# Patient Record
Sex: MALE | Race: WHITE
[De-identification: names, ages, dates, MRNs, and addresses within clinical notes are randomized per-mention and may not be internally consistent; named-entity substitution may affect disease eponyms.]

---

## 2017-01-28 ENCOUNTER — HOSPITAL (OUTPATIENT)
Dept: OTHER | Age: 62
End: 2017-01-28
Attending: INTERNAL MEDICINE

## 2017-01-28 LAB
ALBUMIN SERPL-MCNC: 3.7 GM/DL (ref 3.6–5.1)
ALBUMIN/GLOB SERPL: 0.9 {RATIO} (ref 1–2.4)
ALP SERPL-CCNC: 137 UNIT/L (ref 45–117)
ALT SERPL-CCNC: 37 UNIT/L
ANALYZER ANC (IANC): NORMAL
ANION GAP SERPL CALC-SCNC: 15 MMOL/L (ref 10–20)
AST SERPL-CCNC: 42 UNIT/L
BASOPHILS # BLD: 0 THOUSAND/MCL (ref 0–0.3)
BASOPHILS NFR BLD: 0 %
BILIRUB SERPL-MCNC: 0.8 MG/DL (ref 0.2–1)
BUN SERPL-MCNC: 13 MG/DL (ref 10–20)
BUN/CREAT SERPL: 21 (ref 7–25)
CALCIUM SERPL-MCNC: 8.4 MG/DL (ref 8.4–10.2)
CHLORIDE: 101 MMOL/L (ref 98–107)
CO2 SERPL-SCNC: 26 MMOL/L (ref 21–32)
CREAT SERPL-MCNC: 0.63 MG/DL (ref 0.67–1.17)
DIFFERENTIAL METHOD BLD: NORMAL
EOSINOPHIL # BLD: 0.1 THOUSAND/MCL (ref 0.1–0.5)
EOSINOPHIL NFR BLD: 1 %
ERYTHROCYTE [DISTWIDTH] IN BLOOD: 12.6 % (ref 11–15)
ETHANOL SERPL-MCNC: 234 MG/DL
GLOBULIN SER-MCNC: 3.9 GM/DL (ref 2–4)
GLUCOSE SERPL-MCNC: 143 MG/DL (ref 65–99)
HEMATOCRIT: 41 % (ref 39–51)
HGB BLD-MCNC: 14.4 GM/DL (ref 13–17)
LYMPHOCYTES # BLD: 1.8 THOUSAND/MCL (ref 1–4)
LYMPHOCYTES NFR BLD: 32 %
MAGNESIUM SERPL-MCNC: 2.4 MG/DL (ref 1.7–2.4)
MCH RBC QN AUTO: 30.5 PG (ref 26–34)
MCHC RBC AUTO-ENTMCNC: 35.1 GM/DL (ref 32–36.5)
MCV RBC AUTO: 86.9 FL (ref 78–100)
MONOCYTES # BLD: 0.8 THOUSAND/MCL (ref 0.3–0.9)
MONOCYTES NFR BLD: 14 %
NEUTROPHILS # BLD: 3 THOUSAND/MCL (ref 1.8–7.7)
NEUTROPHILS NFR BLD: 53 %
NEUTS SEG NFR BLD: NORMAL %
PERCENT NRBC: NORMAL
PLATELET # BLD: 177 THOUSAND/MCL (ref 140–450)
POTASSIUM SERPL-SCNC: 3.9 MMOL/L (ref 3.4–5.1)
PROT SERPL-MCNC: 7.6 GM/DL (ref 6.4–8.2)
RBC # BLD: 4.72 MILLION/MCL (ref 4.5–5.9)
SODIUM SERPL-SCNC: 138 MMOL/L (ref 135–145)
WBC # BLD: 5.7 THOUSAND/MCL (ref 4.2–11)

## 2017-01-28 NOTE — ED PROVIDER NOTES
Patient Seen in: [facility] Emergency Department    History   Patient presents with:  Eval-P (psychiatric)    Stated Complaint: ETOH, eval p    HPI    11year-old male who presents here to the emergency department wanting detox from alcohol.   The pat reviewed. No pertinent family history.       Smoking Status: Former Smoker                   Packs/Day: 2.00  Years: 20        Types: Cigarettes      Quit date: 01/01/1990    Alcohol Use: Yes           56.4 oz/week       24 Cans of beer, 70 Shots of liquor of motion is noted of the extremities without deformities. No tenderness. Neurologically intact. No tremors.        ED Course     Labs Reviewed   COMP METABOLIC PANEL (14) - Abnormal; Notable for the following:     Glucose 133 (*)     BUN 23 (*)     Fanta (primary encounter diagnosis)    Disposition:  Discharge    Follow-up:  Yajaira Renner, [address] [facility] [address]  [phone]    Schedule an appointment as soon as possible for a visit in 2 days        Medications P

## 2017-01-28 NOTE — ED NOTES
Pt denies any S/I or H/I at this time. Pt here for help to stop drinking. Pt states he cannot stop drinking on his own. Pt states he has been drinking for the past week all day long. Pt states today he realized it was time to quit.  Pt states his family is

## 2017-01-28 NOTE — ED PROVIDER NOTES
Patient is a 62-year-old male presents the emergency department secondary to alcohol abuse. He was initially evaluated by Dr. Penny Arzate, please refer to his documentation for additional details. Patient was signed out to me at change of shift.   He was obs

## 2017-01-28 NOTE — BH LEVEL OF CARE ASSESSMENT
Comprehensive Assessment Note  General Questions   Why are you here?: Pt is a 62 yr old male who arrived to the ER via ambulance. Pt has a hx of alcohol abuse.  Per ER MD, pt stated he was sober for 1 year then relapsed 5 days ago and has been drinking sinc Current/Recent Suicide Risk Collateral: na   Past Suicidal Ideation: Refused to discuss (pt refused assessment)   Past Suicide Plan: Refused to discuss (pt refused assessment)   Past Suicide Intent: Refused to discuss (pt refused assessment)   Past Suicide refused assessment)   Use of Sleep Aids: (pt refused assessment)   Appetite Symptoms: (pt refused assessment)   Unplanned Weight Loss: (pt refused assessment)   Unplanned Weight Gain: (pt refused assessment)   History of Eating Disorder: (pt refused assess at 7:35pm on 01/27/2017)   Process Addiction/ Behaviors   Repetitive/Compulsive Behaviors in the past 30 days: (pt refused assessment)           Functional Impairment   Currently Attending School: (pt refused assessment)   Employment Status: (pt refused as Per ER MD, pt stated he was sober for 1 yr then relapsed 5 days ago and has been drinking since. Pt told this writer he receives counseling at [email or web address] and attends AA.  Pt states \"I slipped up\" and is refusing services from [facility] or

## 2017-01-28 NOTE — ED NOTES
UNABLE TO FIND PHONE TO CALL ANYONE FOR --REMAINS WITH HIGH ALCOHOL LEVEL--REMAINS IN ROOM TIL LEVEL LEGAL TO LEAVE ON OWN ACCORD

## 2017-01-28 NOTE — ED NOTES
IN AND OUT OF SLEEPING PATTERN. OK FOR DC. WILL FU WITH PROGRAM ALREADY IN-[LACE. IN AGREEMENT WITH POC. IV DCD INTACT.   TO GO HOME P[ER CAB

## 2017-01-29 ENCOUNTER — CHARTING TRANS (OUTPATIENT)
Dept: OTHER | Age: 62
End: 2017-01-29

## 2017-01-29 LAB
ANALYZER ANC (IANC): ABNORMAL
ANION GAP SERPL CALC-SCNC: 11 MMOL/L (ref 10–20)
BASOPHILS # BLD: 0 THOUSAND/MCL (ref 0–0.3)
BASOPHILS NFR BLD: 0 %
BUN SERPL-MCNC: 14 MG/DL (ref 10–20)
BUN/CREAT SERPL: 20 (ref 7–25)
CALCIUM SERPL-MCNC: 7.7 MG/DL (ref 8.4–10.2)
CHLORIDE: 109 MMOL/L (ref 98–107)
CO2 SERPL-SCNC: 29 MMOL/L (ref 21–32)
CREAT SERPL-MCNC: 0.69 MG/DL (ref 0.67–1.17)
DIFFERENTIAL METHOD BLD: ABNORMAL
EOSINOPHIL # BLD: 0.3 THOUSAND/MCL (ref 0.1–0.5)
EOSINOPHIL NFR BLD: 5 %
ERYTHROCYTE [DISTWIDTH] IN BLOOD: 12.9 % (ref 11–15)
GLUCOSE SERPL-MCNC: 99 MG/DL (ref 65–99)
HEMATOCRIT: 36 % (ref 39–51)
HGB BLD-MCNC: 12.5 GM/DL (ref 13–17)
LYMPHOCYTES # BLD: 1.5 THOUSAND/MCL (ref 1–4)
LYMPHOCYTES NFR BLD: 28 %
MAGNESIUM SERPL-MCNC: 2.1 MG/DL (ref 1.7–2.4)
MCH RBC QN AUTO: 30.6 PG (ref 26–34)
MCHC RBC AUTO-ENTMCNC: 34.7 GM/DL (ref 32–36.5)
MCV RBC AUTO: 88.2 FL (ref 78–100)
MONOCYTES # BLD: 0.7 THOUSAND/MCL (ref 0.3–0.9)
MONOCYTES NFR BLD: 12 %
NEUTROPHILS # BLD: 2.8 THOUSAND/MCL (ref 1.8–7.7)
NEUTROPHILS NFR BLD: 55 %
NEUTS SEG NFR BLD: ABNORMAL %
PERCENT NRBC: ABNORMAL
PLATELET # BLD: 140 THOUSAND/MCL (ref 140–450)
POTASSIUM SERPL-SCNC: 3.8 MMOL/L (ref 3.4–5.1)
RBC # BLD: 4.08 MILLION/MCL (ref 4.5–5.9)
SODIUM SERPL-SCNC: 145 MMOL/L (ref 135–145)
WBC # BLD: 5.2 THOUSAND/MCL (ref 4.2–11)

## 2017-02-08 ENCOUNTER — HOSPITAL (OUTPATIENT)
Dept: OTHER | Age: 62
End: 2017-02-08
Attending: EMERGENCY MEDICINE

## 2017-02-08 LAB
ALBUMIN SERPL-MCNC: 3.6 GM/DL (ref 3.6–5.1)
ALBUMIN/GLOB SERPL: 0.8 {RATIO} (ref 1–2.4)
ALP SERPL-CCNC: 130 UNIT/L (ref 45–117)
ALT SERPL-CCNC: 32 UNIT/L
AMPHETAMINES UR QL SCN>500 NG/ML: NEGATIVE
ANALYZER ANC (IANC): ABNORMAL
ANION GAP SERPL CALC-SCNC: 21 MMOL/L (ref 10–20)
AST SERPL-CCNC: 32 UNIT/L
BARBITURATES UR QL SCN>200 NG/ML: NEGATIVE
BASOPHILS # BLD: 0 THOUSAND/MCL (ref 0–0.3)
BASOPHILS NFR BLD: 1 %
BENZODIAZ UR QL SCN>200 NG/ML: POSITIVE
BILIRUB SERPL-MCNC: 0.3 MG/DL (ref 0.2–1)
BUN SERPL-MCNC: 10 MG/DL (ref 10–20)
BUN/CREAT SERPL: 12 (ref 7–25)
BZE UR QL SCN>150 NG/ML: NEGATIVE
CALCIUM SERPL-MCNC: 8.8 MG/DL (ref 8.4–10.2)
CANNABINOIDS UR QL SCN>50 NG/ML: NEGATIVE
CHLORIDE: 103 MMOL/L (ref 98–107)
CO2 SERPL-SCNC: 23 MMOL/L (ref 21–32)
CREAT SERPL-MCNC: 0.86 MG/DL (ref 0.67–1.17)
DIFFERENTIAL METHOD BLD: ABNORMAL
EOSINOPHIL # BLD: 0.2 THOUSAND/MCL (ref 0.1–0.5)
EOSINOPHIL NFR BLD: 3 %
ERYTHROCYTE [DISTWIDTH] IN BLOOD: 13.8 % (ref 11–15)
ETHANOL SERPL-MCNC: 262 MG/DL
GLOBULIN SER-MCNC: 4.3 GM/DL (ref 2–4)
GLUCOSE SERPL-MCNC: 117 MG/DL (ref 65–99)
HEMATOCRIT: 43.6 % (ref 39–51)
HGB BLD-MCNC: 15 GM/DL (ref 13–17)
LYMPHOCYTES # BLD: 2.7 THOUSAND/MCL (ref 1–4)
LYMPHOCYTES NFR BLD: 41 %
MCH RBC QN AUTO: 30.9 PG (ref 26–34)
MCHC RBC AUTO-ENTMCNC: 34.4 GM/DL (ref 32–36.5)
MCV RBC AUTO: 89.7 FL (ref 78–100)
MONOCYTES # BLD: 1.1 THOUSAND/MCL (ref 0.3–0.9)
MONOCYTES NFR BLD: 18 %
NEUTROPHILS # BLD: 2.4 THOUSAND/MCL (ref 1.8–7.7)
NEUTROPHILS NFR BLD: 37 %
NEUTS SEG NFR BLD: ABNORMAL %
OPIATES UR QL SCN>300 NG/ML: NEGATIVE
PCP UR QL SCN>25 NG/ML: NEGATIVE
PERCENT NRBC: ABNORMAL
PLATELET # BLD: 219 THOUSAND/MCL (ref 140–450)
POTASSIUM SERPL-SCNC: 3.5 MMOL/L (ref 3.4–5.1)
PROT SERPL-MCNC: 7.9 GM/DL (ref 6.4–8.2)
RBC # BLD: 4.86 MILLION/MCL (ref 4.5–5.9)
SODIUM SERPL-SCNC: 143 MMOL/L (ref 135–145)
WBC # BLD: 6.4 THOUSAND/MCL (ref 4.2–11)

## 2017-02-26 ENCOUNTER — HOSPITAL (OUTPATIENT)
Dept: OTHER | Age: 62
End: 2017-02-26
Attending: HOSPITALIST

## 2017-02-26 ENCOUNTER — DIAGNOSTIC TRANS (OUTPATIENT)
Dept: OTHER | Age: 62
End: 2017-02-26

## 2017-02-26 LAB
ALBUMIN SERPL-MCNC: 4 GM/DL (ref 3.6–5.1)
ALP SERPL-CCNC: 132 UNIT/L (ref 45–117)
ALT SERPL-CCNC: 241 UNIT/L
AMORPH SED URNS QL MICRO: ABNORMAL
AMPHETAMINES UR QL SCN>500 NG/ML: NEGATIVE
ANALYZER ANC (IANC): ABNORMAL
ANION GAP SERPL CALC-SCNC: 23 MMOL/L (ref 10–20)
APPEARANCE UR: CLEAR
APTT PPP: 22 SECONDS (ref 22–30)
APTT PPP: NORMAL S
AST SERPL-CCNC: 415 UNIT/L
BACTERIA #/AREA URNS HPF: ABNORMAL /HPF
BARBITURATES UR QL SCN>200 NG/ML: NEGATIVE
BASOPHILS # BLD: 0 THOUSAND/MCL (ref 0–0.3)
BASOPHILS NFR BLD: 0 %
BENZODIAZ UR QL SCN>200 NG/ML: POSITIVE
BILIRUB CONJ SERPL-MCNC: 0.4 MG/DL (ref 0–0.2)
BILIRUB SERPL-MCNC: 0.8 MG/DL (ref 0.2–1)
BILIRUB UR QL: NEGATIVE
BUN SERPL-MCNC: 12 MG/DL (ref 6–20)
BUN/CREAT SERPL: 16 (ref 7–25)
BZE UR QL SCN>150 NG/ML: NEGATIVE
CALCIUM SERPL-MCNC: 8.8 MG/DL (ref 8.4–10.2)
CANNABINOIDS UR QL SCN>50 NG/ML: NEGATIVE
CAOX CRY URNS QL MICRO: ABNORMAL
CHLORIDE: 97 MMOL/L (ref 98–107)
CO2 SERPL-SCNC: 25 MMOL/L (ref 21–32)
COLOR UR: YELLOW
CREAT SERPL-MCNC: 0.74 MG/DL (ref 0.67–1.17)
DIFFERENTIAL METHOD BLD: ABNORMAL
EOSINOPHIL # BLD: 0.1 THOUSAND/MCL (ref 0.1–0.5)
EOSINOPHIL NFR BLD: 1 %
EPITH CASTS #/AREA URNS LPF: ABNORMAL /[LPF]
ERYTHROCYTE [DISTWIDTH] IN BLOOD: 14.7 % (ref 11–15)
ETHANOL SERPL-MCNC: 469 MG/DL
FATTY CASTS #/AREA URNS LPF: ABNORMAL /[LPF]
GLUCOSE SERPL-MCNC: 112 MG/DL (ref 65–99)
GLUCOSE UR-MCNC: NEGATIVE MG/DL
GRAN CASTS #/AREA URNS LPF: ABNORMAL /[LPF]
HEMATOCRIT: 45 % (ref 39–51)
HGB BLD-MCNC: 16.2 GM/DL (ref 13–17)
HGB UR QL: ABNORMAL
HYALINE CASTS #/AREA URNS LPF: ABNORMAL /LPF (ref 0–5)
INR PPP: 1.1
KETONES UR-MCNC: ABNORMAL MG/DL
LEUKOCYTE ESTERASE UR QL STRIP: NEGATIVE
LIPASE SERPL-CCNC: 527 UNIT/L (ref 73–393)
LYMPHOCYTES # BLD: 2.1 THOUSAND/MCL (ref 1–4)
LYMPHOCYTES NFR BLD: 42 %
MAGNESIUM SERPL-MCNC: 2.3 MG/DL (ref 1.7–2.4)
MCH RBC QN AUTO: 32 PG (ref 26–34)
MCHC RBC AUTO-ENTMCNC: 36 GM/DL (ref 32–36.5)
MCV RBC AUTO: 88.9 FL (ref 78–100)
MICROSCOPIC (MT): ABNORMAL
MIXED CELL CASTS #/AREA URNS LPF: ABNORMAL /[LPF]
MONOCYTES # BLD: 0.6 THOUSAND/MCL (ref 0.3–0.9)
MONOCYTES NFR BLD: 11 %
MUCOUS THREADS URNS QL MICRO: PRESENT
NEUTROPHILS # BLD: 2.4 THOUSAND/MCL (ref 1.8–7.7)
NEUTROPHILS NFR BLD: 46 %
NEUTS SEG NFR BLD: ABNORMAL %
NITRITE UR QL: NEGATIVE
OPIATES UR QL SCN>300 NG/ML: NEGATIVE
PCP UR QL SCN>25 NG/ML: NEGATIVE
PERCENT NRBC: ABNORMAL
PH UR: 6 UNIT (ref 5–7)
PHOSPHATE SERPL-MCNC: 5.1 MG/DL (ref 2.4–4.7)
PLATELET # BLD: 87 THOUSAND/MCL (ref 140–450)
POTASSIUM SERPL-SCNC: 3.7 MMOL/L (ref 3.4–5.1)
PROT SERPL-MCNC: 9 GM/DL (ref 6.4–8.2)
PROT UR QL: 100 MG/DL
PROTHROMBIN TIME: 11.2 SECONDS (ref 9.7–11.8)
PROTHROMBIN TIME: NORMAL
RBC # BLD: 5.06 MILLION/MCL (ref 4.5–5.9)
RBC #/AREA URNS HPF: ABNORMAL /HPF (ref 0–3)
RBC CASTS #/AREA URNS LPF: ABNORMAL /[LPF]
RENAL EPI CELLS #/AREA URNS HPF: ABNORMAL /[HPF]
SODIUM SERPL-SCNC: 141 MMOL/L (ref 135–145)
SP GR UR: 1.01 (ref 1–1.03)
SPECIMEN SOURCE: ABNORMAL
SPERM URNS QL MICRO: ABNORMAL
SQUAMOUS #/AREA URNS HPF: ABNORMAL /HPF (ref 0–5)
T VAGINALIS URNS QL MICRO: ABNORMAL
TRI-PHOS CRY URNS QL MICRO: ABNORMAL
URATE CRY URNS QL MICRO: ABNORMAL
URNS CMNT MICRO: ABNORMAL
UROBILINOGEN UR QL: 4 MG/DL (ref 0–1)
WAXY CASTS #/AREA URNS LPF: ABNORMAL /[LPF]
WBC # BLD: 5.1 THOUSAND/MCL (ref 4.2–11)
WBC #/AREA URNS HPF: ABNORMAL /HPF (ref 0–5)
WBC CASTS #/AREA URNS LPF: ABNORMAL /[LPF]
YEAST HYPHAE URNS QL MICRO: ABNORMAL
YEAST URNS QL MICRO: ABNORMAL

## 2017-02-27 LAB
ALBUMIN SERPL-MCNC: 3 GM/DL (ref 3.6–5.1)
ALBUMIN/GLOB SERPL: 0.9 {RATIO} (ref 1–2.4)
ALP SERPL-CCNC: 93 UNIT/L (ref 45–117)
ALT SERPL-CCNC: 184 UNIT/L
ANALYZER ANC (IANC): ABNORMAL
ANION GAP SERPL CALC-SCNC: 13 MMOL/L (ref 10–20)
AST SERPL-CCNC: 266 UNIT/L
BASOPHILS # BLD: 0 THOUSAND/MCL (ref 0–0.3)
BASOPHILS NFR BLD: 0 %
BILIRUB SERPL-MCNC: 1.1 MG/DL (ref 0.2–1)
BUN SERPL-MCNC: 21 MG/DL (ref 6–20)
BUN/CREAT SERPL: 30 (ref 7–25)
CALCIUM SERPL-MCNC: 8.4 MG/DL (ref 8.4–10.2)
CHLORIDE: 104 MMOL/L (ref 98–107)
CO2 SERPL-SCNC: 29 MMOL/L (ref 21–32)
CREAT SERPL-MCNC: 0.7 MG/DL (ref 0.67–1.17)
DIFFERENTIAL METHOD BLD: ABNORMAL
EOSINOPHIL # BLD: 0 THOUSAND/MCL (ref 0.1–0.5)
EOSINOPHIL NFR BLD: 0 %
ERYTHROCYTE [DISTWIDTH] IN BLOOD: 15.5 % (ref 11–15)
GLOBULIN SER-MCNC: 3.5 GM/DL (ref 2–4)
GLUCOSE SERPL-MCNC: 131 MG/DL (ref 65–99)
HEMATOCRIT: 38.2 % (ref 39–51)
HGB BLD-MCNC: 12.7 GM/DL (ref 13–17)
LIPASE SERPL-CCNC: 574 UNIT/L (ref 73–393)
LYMPHOCYTES # BLD: 0.6 THOUSAND/MCL (ref 1–4)
LYMPHOCYTES NFR BLD: 12 %
MCH RBC QN AUTO: 30.5 PG (ref 26–34)
MCHC RBC AUTO-ENTMCNC: 33.2 GM/DL (ref 32–36.5)
MCV RBC AUTO: 91.6 FL (ref 78–100)
MONOCYTES # BLD: 0.5 THOUSAND/MCL (ref 0.3–0.9)
MONOCYTES NFR BLD: 10 %
NEUTROPHILS # BLD: 3.6 THOUSAND/MCL (ref 1.8–7.7)
NEUTROPHILS NFR BLD: 78 %
NEUTS SEG NFR BLD: ABNORMAL %
PERCENT NRBC: ABNORMAL
PLATELET # BLD: 80 THOUSAND/MCL (ref 140–450)
POTASSIUM SERPL-SCNC: 3.8 MMOL/L (ref 3.4–5.1)
PROT SERPL-MCNC: 6.5 GM/DL (ref 6.4–8.2)
RBC # BLD: 4.17 MILLION/MCL (ref 4.5–5.9)
SODIUM SERPL-SCNC: 142 MMOL/L (ref 135–145)
WBC # BLD: 4.6 THOUSAND/MCL (ref 4.2–11)

## 2017-02-28 LAB
CREAT SERPL-MCNC: 0.61 MG/DL (ref 0.67–1.17)
POTASSIUM SERPL-SCNC: 3.3 MMOL/L (ref 3.4–5.1)

## 2017-03-05 ENCOUNTER — APPOINTMENT (OUTPATIENT)
Dept: GENERAL RADIOLOGY | Facility: HOSPITAL | Age: 62
DRG: 897 | End: 2017-03-05
Attending: INTERNAL MEDICINE
Payer: COMMERCIAL

## 2017-03-05 PROBLEM — E87.6 HYPOKALEMIA: Status: ACTIVE | Noted: 2017-03-05

## 2017-03-05 PROBLEM — E87.1 HYPONATREMIA: Status: ACTIVE | Noted: 2017-03-05

## 2017-03-05 PROBLEM — R73.9 HYPERGLYCEMIA: Status: ACTIVE | Noted: 2017-03-05

## 2017-03-05 PROCEDURE — 71101 X-RAY EXAM UNILAT RIBS/CHEST: CPT

## 2017-03-05 NOTE — BH PROGRESS NOTE
Spoke to Celia JHA aware of possible need for consult. ER currently doing med workup for medical clearance and will call back if pt needs assessment in ER.

## 2017-03-05 NOTE — ED PROVIDER NOTES
Patient Seen in: [facility] Emergency Department    History   Patient presents with:  Eval-P (psychiatric)    Stated Complaint: etoh    HPI    71-year-old male complaining of alcohol withdrawal.  This patient has a history of alcohol abuse he states Smoker                   Packs/Day: 2.00  Years: 20        Types: Cigarettes      Quit date: 01/01/1990    Alcohol Use: Yes           56.4 oz/week       24 Cans of beer, 70 Shots of liquor per week      Review of Systems    Positive for stated complaint: e DIFFERENTIAL WITH PLATELET.   Procedure                               Abnormality         Status                     ---------                               -----------         ------                     CBC W/ DIFFERENTIAL[[id number]]          Abnormal

## 2017-03-05 NOTE — H&P
[facility]                                                               History & Physical         Jeremy Bustillostes Patient Status:  Emergency    3/3/1955 MRN [id number]   Location [address] [facility] Attending Sowmya Le MD reports that he drinks about 56.4 oz of alcohol per week. He reports that he does not use illicit drugs.     Allergies:  No Known Allergies    Home Medications:    (Not in a hospital admission)    Review of Systems:  A comprehensive 14 point review of syste pain or shortness of breath, continue home medications  5. Hyperlipidemia– continue Crestor  6. Hypokalemia–replace  7. Hyponatremia secondary dehydration–IV fluids, follow BMP  8.  Elevated liver function tests secondary to alcoholic hepatitis–follow CMP

## 2017-03-05 NOTE — CONSULTS
SHEILAG PULMONARY/CRITICAL CARE CONSULTATION    HPI: Maru Laws is a 65year old male with a history of etoh abuse and prior withdrawal episodes who presented to the ER for detox. He was admitted to the ICU for Etoh withdrawal and we are being consulted.  He injection 1 mg 1 mg Intravenous Once   LORazepam (ATIVAN) tab 1 mg 1 mg Oral Q30 Min PRN   Or      LORazepam (ATIVAN) injection 1 mg 1 mg Intravenous Q30 Min PRN   LORazepam (ATIVAN) injection 2 mg 2 mg Intravenous Q15 Min PRN   LORazepam (ATIVAN) injectio Activity: Not on file   Not on file  Other Topics Concern   None on file     Social History Narrative    ** Merged History Encounter **           History reviewed. No pertinent family history.     ROS: 10 pt ROS negative except what is mentioned in HPI    O DEION Yanez

## 2017-03-05 NOTE — ED NOTES
CRISIS CALLED AND STATED THAT PT NEEDS MEDICAL CLEARANCE PRIOR TO BEING ASSESSED AS THIS PT'S WITHDRAWAL HAS PLACED HIM IN THE ICU IN THE PAST. AWAITING IMAGING AND LABS PRIOR TO DECISION TO ADMIT TO [facility] FOR MEDICAL CLEARANCE.

## 2017-03-05 NOTE — ED INITIAL ASSESSMENT (HPI)
Pt states he is a daily drinker and would like help with detox today. Pt states he drank sangria 2 glasses at 0400 this am last. Pt admits to feeling very shaky and anxious this am. Denies wanting to hurt himself or others.

## 2017-03-06 PROBLEM — F10.20 ETOH DEPENDENCE (HCC): Status: ACTIVE | Noted: 2017-03-06

## 2017-03-06 NOTE — RESPIRATORY THERAPY NOTE
MOE : EQUIPMENT USE: DAILY SUMMARY                                            SET MODE: AUTO CPAP WITH CFLEX                                          USAGE IN HOURS: 13:12                                          9

## 2017-03-06 NOTE — PROGRESS NOTES
[facility]  Progress Note    Edy Whaley Patient Status:  Inpatient    3/3/1955 MRN [id number]   [facility] 4SW-A Attending Stanley Fleischer, MD   Hosp Day # 1 PCP Maylin Kaur MD     CC:  Alcohol withdrawal    SUBJECTIVE: 03/06/2017   CO2 26.0 03/06/2017    03/06/2017   CA 8.6 03/06/2017   ALB 2.9 03/06/2017   ALKPHO 98 03/06/2017   BILT 1.3 03/06/2017   TP 6.5 03/06/2017   AST 80 03/06/2017    03/06/2017   PTT 26.6 03/05/2017   INR 0.97 03/05/2017   PTP 12.9 UNIT/ML injection 5,000 Units 5,000 Units Subcutaneous Q12H   LORazepam (ATIVAN) injection 1 mg 1 mg Intravenous Q30 Min PRN   LORazepam (ATIVAN) injection 2 mg 2 mg Intravenous Q15 Min PRN   LORazepam (ATIVAN) injection 3 mg 3 mg Intravenous Q15 Min PRN

## 2017-03-06 NOTE — PAYOR COMM NOTE
Attending Physician: Jagdeep Del Angel*    Review Type: ADMISSION   Reviewer: Hugo Navarro       Date: March 6, 2017 - 1:13 PM  Payor: KRISTEL NINA  Authorization Number: [id number]  Admit date: 3/5/2017 11:14 AM   Admitted from Emergency Dept.: yes ChlordiazePOXIDE HCl (LIBRIUM) cap 10 mg     Date Action Dose Route User    3/6/2017 0844 Given 10 mg Oral Rosario Gonzalez RN    3/5/2017 2100 Given 10 mg Oral Reina Qureshi, RN    3/5/2017 1719 Given 10 mg Oral Nella Reese, CARY      folic aci ondansetron HCl (ZOFRAN) injection 4 mg     Date Action Dose Route User    3/5/2017 1545 Given 4 mg Intravenous Akua Prado, CARY      potassium chloride IVPB premix 20 mEq     Date Action Dose Route User    3/6/2017 0886 New Bag 20 mEq Intravenous Short, All other components within normal limits    Narrative:     Results of the Urine Drug Screen should be used only for medical purposes.    BASIC METABOLIC PANEL (8) - Abnormal; Notable for the following:     Glucose 109 (*)     Creatinine 0.65 (*)     All o Please view results for these tests on the individual orders.    URINALYSIS WITH CULTURE REFLEX   BENZODIAZEPINE CONFIRMATION,UR   RAINBOW DRAW BLUE   RAINBOW DRAW GOLD   RAINBOW DRAW LAVENDER   RAINBOW DRAW LIGHT GREEN   MRSA CULTURE ONLY       ASSESSMENT

## 2017-03-06 NOTE — PROGRESS NOTES
Updated by RN of patient O2 sats remaining 92% on RA while awake throughout the day, however, desats to 88-89% while sleeping. CPAP initiated last evening for desaturations while sleeping. RN discussing with [facility] possibility of having CPAP at night.  Discus

## 2017-03-06 NOTE — PLAN OF CARE
DRUG ABUSE/DETOX    • Will have no detox symptoms and will verbalize plan for changing drug-related behavior Not Progressing          GASTROINTESTINAL - ADULT    • Minimal or absence of nausea and vomiting Not Progressing          METABOLIC/FLUID AND ELECT

## 2017-03-06 NOTE — PROGRESS NOTES
Pulmonary Progress Note        NAME: Asia Ramirez - ROOM: 101/167-X - MRN: [id number] - Age: 65year old - : 3/3/1955        SUBJECTIVE: No events overnight    OBJECTIVE:   17  0630 17  0700 17  0800 17  0900   BP: 110/68 139/82 1 133* 139   K 3.2* 3.8   CL 97* 102   CO2 26.0 26.0   BUN 3* 8   CA 9.5 8.6   MG 2.1  --          Recent Labs   03/05/17  1128   *   *   ALB 3.8       Invalid input(s): ARTERIALPH, ARTERIALPO2, ARTERIALPCO2, ARTERIALHCO3    No results for inpu

## 2017-03-06 NOTE — DISCHARGE SUMMARY
[facility]  Discharge Summary    Yesenia Downs Patient Status:  Inpatient    3/3/1955 MRN [id number]   [facility] 4SW-A Attending No att. providers found   Hosp Day # 1 PCP Giselle Peacock MD     Date of Admission: 3/5/2017 protocol for alcohol withdrawal    Brief Synopsis: Treated for alcohol withdrawal with CIWA protocol with Ativan and monitored in ICU. 1. Alcohol withdrawal–CIWA protocol with Ativan; improving  2. Alcohol abuse–psych consult  3.  Essential hypertension–co LIBRIUM        Take 1 capsule (25 mg total) by mouth 3 (three) times daily as needed for Anxiety.     Quantity:  20 capsule   Refills:  0       CRESTOR 10 MG Tabs   Last time this was given:  10 mg on 3/6/2017  8:45 AM   Generic drug:  Rosuvastatin Calcium Normocephalic, without obvious abnormality, atraumatic  Throat: oral mucosa moist  Neck: no adenopathy, no carotid bruit, no JVD  Lungs: clear to auscultation bilaterally  Heart: S1, S2 normal, no murmur,  regular rate and rhythm  Abdomen: soft, non-tender

## 2017-03-07 PROBLEM — E66.9 OBESITY: Status: ACTIVE | Noted: 2017-03-07

## 2017-03-07 PROBLEM — F32.A DEPRESSION: Status: ACTIVE | Noted: 2017-03-07

## 2017-03-08 PROBLEM — F10.20 ALCOHOL USE DISORDER, SEVERE, DEPENDENCE (HCC): Status: ACTIVE | Noted: 2017-03-08

## 2017-03-08 NOTE — PAYOR COMM NOTE
Review Type: CONTINUED STAY  Reviewer: Odalys Stovall     Date: March 8, 2017 - 7:17 AM  Payor: KRISTEL NINA  Authorization Number: [id number]  Admit date: 3/5/2017 11:14 AM        Date of Discharge: 3/6/2017  5:19 PM    Disposition: [facility] or Psych Route User    3/7/2017 0906 Given 100 mg Oral Constanza Julien RN      Thiamine HCl tab 100 mg     Date Action Dose Route User    3/7/2017 6555 Given 100 mg Oral Whitt, Leopoldo Hal, RN      TraZODone HCl (DESYREL) tab 50 mg     Date Action Dose Route User

## 2017-03-09 NOTE — PAYOR COMM NOTE
PATIENT SIGNED RELEASE OF INFORMATION FORM- ADDITIONAL CLINICAL THEREFORE PROVIDED      Patient required inpatient admission and ICU monitoring due to alcohol withdrawal with severe dysfunction in daily living with tachycardia, agitation, tremors and hallu   03/05/17 1122 98.3 °F (36.8 °C) 111 20  142/108 mmHg 95 % 190 lb AA      03/05/17  1310    03/05/17  1201    03/05/17  1122                    CIWA-Ar     BP  116/95 mmHg  152/114 mmHg  142/108 mmHg           -AA  -AA  -AA         Pulse  104  102  111 3/8/2017 1844 Given 50 mg Oral Berta Cardozo RN      multivitamin (ADULT) tab 1 tablet     Date Action Dose Route User    3/9/2017 1637 Given 1 tablet Oral Emmett Vernon RN      Rosuvastatin Calcium (CRESTOR) 20 MG tab 10 mg     Date Action Dose Route

## 2017-04-10 ENCOUNTER — DIAGNOSTIC TRANS (OUTPATIENT)
Dept: OTHER | Age: 62
End: 2017-04-10

## 2017-04-10 ENCOUNTER — CHARTING TRANS (OUTPATIENT)
Dept: OTHER | Age: 62
End: 2017-04-10

## 2017-04-10 ENCOUNTER — HOSPITAL (OUTPATIENT)
Dept: OTHER | Age: 62
End: 2017-04-10
Attending: INTERNAL MEDICINE

## 2017-04-10 LAB
ALBUMIN SERPL-MCNC: 3.7 GM/DL (ref 3.6–5.1)
ALP SERPL-CCNC: 106 UNIT/L (ref 45–117)
ALT SERPL-CCNC: 118 UNIT/L
AMPHETAMINES UR QL SCN>500 NG/ML: NEGATIVE
ANALYZER ANC (IANC): NORMAL
ANION GAP SERPL CALC-SCNC: 22 MMOL/L (ref 10–20)
AST SERPL-CCNC: 111 UNIT/L
BARBITURATES UR QL SCN>200 NG/ML: NEGATIVE
BASOPHILS # BLD: 0 THOUSAND/MCL (ref 0–0.3)
BASOPHILS NFR BLD: 0 %
BENZODIAZ UR QL SCN>200 NG/ML: POSITIVE
BILIRUB CONJ SERPL-MCNC: 0.2 MG/DL (ref 0–0.2)
BILIRUB SERPL-MCNC: 0.5 MG/DL (ref 0.2–1)
BUN SERPL-MCNC: 19 MG/DL (ref 6–20)
BUN/CREAT SERPL: 22 (ref 7–25)
BZE UR QL SCN>150 NG/ML: NEGATIVE
CALCIUM SERPL-MCNC: 8.3 MG/DL (ref 8.4–10.2)
CANNABINOIDS UR QL SCN>50 NG/ML: NEGATIVE
CHLORIDE: 100 MMOL/L (ref 98–107)
CO2 SERPL-SCNC: 22 MMOL/L (ref 21–32)
CREAT SERPL-MCNC: 0.87 MG/DL (ref 0.67–1.17)
DIFFERENTIAL METHOD BLD: NORMAL
EOSINOPHIL # BLD: 0.1 THOUSAND/MCL (ref 0.1–0.5)
EOSINOPHIL NFR BLD: 1 %
ERYTHROCYTE [DISTWIDTH] IN BLOOD: 13.9 % (ref 11–15)
ETHANOL SERPL-MCNC: 271 MG/DL
GLUCOSE SERPL-MCNC: 268 MG/DL (ref 65–99)
HEMATOCRIT: 46.2 % (ref 39–51)
HGB BLD-MCNC: 16.2 GM/DL (ref 13–17)
LYMPHOCYTES # BLD: 2.5 THOUSAND/MCL (ref 1–4)
LYMPHOCYTES NFR BLD: 36 %
MAGNESIUM SERPL-MCNC: 2.3 MG/DL (ref 1.7–2.4)
MCH RBC QN AUTO: 32 PG (ref 26–34)
MCHC RBC AUTO-ENTMCNC: 35.1 GM/DL (ref 32–36.5)
MCV RBC AUTO: 91.3 FL (ref 78–100)
MONOCYTES # BLD: 0.6 THOUSAND/MCL (ref 0.3–0.9)
MONOCYTES NFR BLD: 8 %
NEUTROPHILS # BLD: 3.8 THOUSAND/MCL (ref 1.8–7.7)
NEUTROPHILS NFR BLD: 55 %
NEUTS SEG NFR BLD: NORMAL %
OPIATES UR QL SCN>300 NG/ML: NEGATIVE
PCP UR QL SCN>25 NG/ML: NEGATIVE
PERCENT NRBC: NORMAL
PLATELET # BLD: 259 THOUSAND/MCL (ref 140–450)
POTASSIUM SERPL-SCNC: 3.7 MMOL/L (ref 3.4–5.1)
PROT SERPL-MCNC: 7.8 GM/DL (ref 6.4–8.2)
RBC # BLD: 5.06 MILLION/MCL (ref 4.5–5.9)
SODIUM SERPL-SCNC: 140 MMOL/L (ref 135–145)
TSH SERPL-ACNC: 2.48 MCUNIT/ML (ref 0.35–5)
WBC # BLD: 7 THOUSAND/MCL (ref 4.2–11)

## 2017-04-11 ENCOUNTER — CHARTING TRANS (OUTPATIENT)
Dept: OTHER | Age: 62
End: 2017-04-11

## 2017-04-11 LAB
ALBUMIN SERPL-MCNC: 3.2 GM/DL (ref 3.6–5.1)
ALBUMIN/GLOB SERPL: 0.9 {RATIO} (ref 1–2.4)
ALP SERPL-CCNC: 80 UNIT/L (ref 45–117)
ALT SERPL-CCNC: 86 UNIT/L
ANALYZER ANC (IANC): ABNORMAL
ANION GAP SERPL CALC-SCNC: 13 MMOL/L (ref 10–20)
AST SERPL-CCNC: 76 UNIT/L
BILIRUB SERPL-MCNC: 1 MG/DL (ref 0.2–1)
BUN SERPL-MCNC: 16 MG/DL (ref 6–20)
BUN/CREAT SERPL: 29 (ref 7–25)
CALCIUM SERPL-MCNC: 8 MG/DL (ref 8.4–10.2)
CHLORIDE: 103 MMOL/L (ref 98–107)
CO2 SERPL-SCNC: 26 MMOL/L (ref 21–32)
CREAT SERPL-MCNC: 0.55 MG/DL (ref 0.67–1.17)
ERYTHROCYTE [DISTWIDTH] IN BLOOD: 14.1 % (ref 11–15)
GLOBULIN SER-MCNC: 3.4 GM/DL (ref 2–4)
GLUCOSE SERPL-MCNC: 100 MG/DL (ref 65–99)
GLYCOHEMOGLOBIN: 6.2 % (ref 4.5–5.6)
HEMATOCRIT: 39.7 % (ref 39–51)
HGB BLD-MCNC: 13.3 GM/DL (ref 13–17)
MCH RBC QN AUTO: 31 PG (ref 26–34)
MCHC RBC AUTO-ENTMCNC: 33.5 GM/DL (ref 32–36.5)
MCV RBC AUTO: 92.5 FL (ref 78–100)
PLATELET # BLD: 167 THOUSAND/MCL (ref 140–450)
POTASSIUM SERPL-SCNC: 3.9 MMOL/L (ref 3.4–5.1)
PROT SERPL-MCNC: 6.6 GM/DL (ref 6.4–8.2)
RBC # BLD: 4.29 MILLION/MCL (ref 4.5–5.9)
SODIUM SERPL-SCNC: 138 MMOL/L (ref 135–145)
WBC # BLD: 4.7 THOUSAND/MCL (ref 4.2–11)

## 2017-04-21 NOTE — ED PROVIDER NOTES
Patient Seen in: [facility] Emergency Department    History   Patient presents with:  Eval-P (psychiatric)    Stated Complaint: ETOH    HPI    50-year-old male presents emergency department who apparently wants detox from alcohol.   He apparently det Smoker                   Packs/Day: 2.00  Years: 20        Types: Cigarettes      Quit date: 01/01/1990    Smokeless Status: Never Used                        Alcohol Use: Yes           56.4 oz/week       24 Cans of beer, 70 Shots of liquor per week for the following:     Glucose 183 (*)     Calcium, Total 8.0 (*)     AST 80 (*)     Alt 76 (*)     Sodium 132 (*)     Potassium 3.5 (*)     Chloride 95 (*)     CO2 17.0 (*)     All other components within normal limits   ETHYL ALCOHOL - Abnormal; Notable

## 2017-04-21 NOTE — ED INITIAL ASSESSMENT (HPI)
Patient presents via EMS after calling 911 requesting detox. Last round of detox approximately 1 month ago. Last drink 2 hours ago. Drank whole bottle of tequila since last night; he also drank 3 beers today.

## 2017-04-22 NOTE — BH LEVEL OF CARE ASSESSMENT
Level of Care Assessment Note  General Questions  Why are you here?: Pt reported \"I got laid off couple of months ago, I have been laid of on and off or a couple years. This triggered my drinking bc I was stressed and had more time on my hands.  I started Suicidal Intent: No  Current/Recent Suicide Rehearsal: No  Suicide Attempt in past 14 days: No  Current/Recent Suicide Risk Mitigating Factors: None available    Current/Recent Suicide Risk Collateral Provided By[de-identified] Pt reported 'My kids\"    Past Suicidal I No  Active Eating Disorder: No                 Current/Previous MH/CD Providers  Hospitalizations, Placements, Therapy, Detox: Yes        Prior [facility] Inpatient  Name: Kirsty Man  Dates of Treatment: 3/6/17- 3/11/17  Date Last Seen: 3/11/17  Reason: ETOH Detox  E days: No                Functional Impairment  Currently Attending School: No  Employment Status: Unemployed  Concerns/Conflicts with Social Relationships: No  Decreased Functional Ability: Clean house  Do you have any prior/current legal concerns?: None he finished the 3 weeks CD IOP program and relapsed on drinking again last week. Pt was admitted at [address] [place] [address] in 2/17 for Etoh detox. Pt reported he relapsed on drinking after getting d/c from  [facility]  and [facility].  Pt  reported he was laid off few months ago,

## 2017-04-25 PROBLEM — M17.11 PRIMARY OSTEOARTHRITIS OF RIGHT KNEE: Status: ACTIVE | Noted: 2017-04-25

## 2017-05-22 PROBLEM — N48.6 PEYRONIE'S DISEASE: Status: ACTIVE | Noted: 2017-05-22

## 2017-05-22 PROBLEM — N40.1 BENIGN NON-NODULAR PROSTATIC HYPERPLASIA WITH LOWER URINARY TRACT SYMPTOMS: Status: ACTIVE | Noted: 2017-05-22

## 2017-06-13 ENCOUNTER — CHARTING TRANS (OUTPATIENT)
Dept: OTHER | Age: 62
End: 2017-06-13

## 2017-08-01 LAB
ALBUMIN SERPL-MCNC: 3.7 GM/DL (ref 3.6–5.1)
ALBUMIN/GLOB SERPL: 0.8 {RATIO} (ref 1–2.4)
ALP SERPL-CCNC: 133 UNIT/L (ref 45–117)
ALT SERPL-CCNC: 40 UNIT/L
AMPHETAMINES UR QL SCN>500 NG/ML: NEGATIVE
ANALYZER ANC (IANC): ABNORMAL
ANION GAP SERPL CALC-SCNC: 21 MMOL/L (ref 10–20)
AST SERPL-CCNC: 37 UNIT/L
B-OH-BUTYR SERPL-SCNC: 0.2 MMOL/L (ref 0–0.3)
BARBITURATES UR QL SCN>200 NG/ML: NEGATIVE
BASOPHILS # BLD: 0 THOUSAND/MCL (ref 0–0.3)
BASOPHILS NFR BLD: 0 %
BENZODIAZ UR QL SCN>200 NG/ML: NEGATIVE
BILIRUB SERPL-MCNC: 0.6 MG/DL (ref 0.2–1)
BUN SERPL-MCNC: 9 MG/DL (ref 6–20)
BUN/CREAT SERPL: 11 (ref 7–25)
BZE UR QL SCN>150 NG/ML: NEGATIVE
CALCIUM SERPL-MCNC: 8.6 MG/DL (ref 8.4–10.2)
CANNABINOIDS UR QL SCN>50 NG/ML: NEGATIVE
CHLORIDE: 102 MMOL/L (ref 98–107)
CO2 SERPL-SCNC: 22 MMOL/L (ref 21–32)
CREAT SERPL-MCNC: 0.81 MG/DL (ref 0.67–1.17)
DIFFERENTIAL METHOD BLD: ABNORMAL
EOSINOPHIL # BLD: 0.1 THOUSAND/MCL (ref 0.1–0.5)
EOSINOPHIL NFR BLD: 1 %
ERYTHROCYTE [DISTWIDTH] IN BLOOD: 12.5 % (ref 11–15)
ETHANOL SERPL-MCNC: 280 MG/DL
GLOBULIN SER-MCNC: 4.9 GM/DL (ref 2–4)
GLUCOSE SERPL-MCNC: 161 MG/DL (ref 65–99)
HEMATOCRIT: 51.2 % (ref 39–51)
HGB BLD-MCNC: 18.4 GM/DL (ref 13–17)
LIPASE SERPL-CCNC: 109 UNIT/L (ref 73–393)
LYMPHOCYTES # BLD: 2.4 THOUSAND/MCL (ref 1–4)
LYMPHOCYTES NFR BLD: 35 %
MAGNESIUM SERPL-MCNC: 2.3 MG/DL (ref 1.7–2.4)
MCH RBC QN AUTO: 31.9 PG (ref 26–34)
MCHC RBC AUTO-ENTMCNC: 35.9 GM/DL (ref 32–36.5)
MCV RBC AUTO: 88.9 FL (ref 78–100)
MONOCYTES # BLD: 0.7 THOUSAND/MCL (ref 0.3–0.9)
MONOCYTES NFR BLD: 10 %
NEUTROPHILS # BLD: 3.9 THOUSAND/MCL (ref 1.8–7.7)
NEUTROPHILS NFR BLD: 54 %
NEUTS SEG NFR BLD: ABNORMAL %
OPIATES UR QL SCN>300 NG/ML: NEGATIVE
PCP UR QL SCN>25 NG/ML: NEGATIVE
PERCENT NRBC: 0
PLATELET # BLD: 303 THOUSAND/MCL (ref 140–450)
POTASSIUM SERPL-SCNC: 4.3 MMOL/L (ref 3.4–5.1)
PROT SERPL-MCNC: 8.6 GM/DL (ref 6.4–8.2)
RBC # BLD: 5.76 MILLION/MCL (ref 4.5–5.9)
SODIUM SERPL-SCNC: 141 MMOL/L (ref 135–145)
WBC # BLD: 7.1 THOUSAND/MCL (ref 4.2–11)

## 2017-08-02 ENCOUNTER — CHARTING TRANS (OUTPATIENT)
Dept: OTHER | Age: 62
End: 2017-08-02

## 2017-08-02 ENCOUNTER — HOSPITAL (OUTPATIENT)
Dept: OTHER | Age: 62
End: 2017-08-02
Attending: INTERNAL MEDICINE

## 2017-08-02 ENCOUNTER — DIAGNOSTIC TRANS (OUTPATIENT)
Dept: OTHER | Age: 62
End: 2017-08-02

## 2017-08-02 LAB
ANION GAP SERPL CALC-SCNC: 16 MMOL/L (ref 10–20)
BUN SERPL-MCNC: 14 MG/DL (ref 6–20)
BUN/CREAT SERPL: 20 (ref 7–25)
CALCIUM SERPL-MCNC: 8.3 MG/DL (ref 8.4–10.2)
CHLORIDE: 106 MMOL/L (ref 98–107)
CO2 SERPL-SCNC: 24 MMOL/L (ref 21–32)
CREAT SERPL-MCNC: 0.69 MG/DL (ref 0.67–1.17)
GLUCOSE SERPL-MCNC: 121 MG/DL (ref 65–99)
MAGNESIUM SERPL-MCNC: 2.1 MG/DL (ref 1.7–2.4)
POTASSIUM SERPL-SCNC: 4.1 MMOL/L (ref 3.4–5.1)
SODIUM SERPL-SCNC: 142 MMOL/L (ref 135–145)

## 2017-08-10 ENCOUNTER — APPOINTMENT (OUTPATIENT)
Dept: GENERAL RADIOLOGY | Facility: HOSPITAL | Age: 62
End: 2017-08-10
Attending: EMERGENCY MEDICINE
Payer: COMMERCIAL

## 2017-08-10 ENCOUNTER — HOSPITAL (OUTPATIENT)
Dept: OTHER | Age: 62
End: 2017-08-10
Attending: EMERGENCY MEDICINE

## 2017-08-10 LAB
ANALYZER ANC (IANC): ABNORMAL
ANION GAP SERPL CALC-SCNC: 17 MMOL/L (ref 10–20)
BASOPHILS # BLD: 0 THOUSAND/MCL (ref 0–0.3)
BASOPHILS NFR BLD: 0 %
BUN SERPL-MCNC: 6 MG/DL (ref 6–20)
BUN/CREAT SERPL: 9 (ref 7–25)
CALCIUM SERPL-MCNC: 8.1 MG/DL (ref 8.4–10.2)
CHLORIDE: 99 MMOL/L (ref 98–107)
CO2 SERPL-SCNC: 26 MMOL/L (ref 21–32)
CREAT SERPL-MCNC: 0.69 MG/DL (ref 0.67–1.17)
DIFFERENTIAL METHOD BLD: ABNORMAL
EOSINOPHIL # BLD: 0.1 THOUSAND/MCL (ref 0.1–0.5)
EOSINOPHIL NFR BLD: 1 %
ERYTHROCYTE [DISTWIDTH] IN BLOOD: 12.3 % (ref 11–15)
ETHANOL SERPL-MCNC: 385 MG/DL
GLUCOSE SERPL-MCNC: 132 MG/DL (ref 65–99)
HEMATOCRIT: 47.6 % (ref 39–51)
HGB BLD-MCNC: 17.6 GM/DL (ref 13–17)
LYMPHOCYTES # BLD: 2.4 THOUSAND/MCL (ref 1–4)
LYMPHOCYTES NFR BLD: 46 %
MCH RBC QN AUTO: 32.4 PG (ref 26–34)
MCHC RBC AUTO-ENTMCNC: 37 GM/DL (ref 32–36.5)
MCV RBC AUTO: 87.5 FL (ref 78–100)
MONOCYTES # BLD: 0.7 THOUSAND/MCL (ref 0.3–0.9)
MONOCYTES NFR BLD: 14 %
NEUTROPHILS # BLD: 2.1 THOUSAND/MCL (ref 1.8–7.7)
NEUTROPHILS NFR BLD: 39 %
NEUTS SEG NFR BLD: ABNORMAL %
PERCENT NRBC: ABNORMAL
PLATELET # BLD: 219 THOUSAND/MCL (ref 140–450)
POTASSIUM SERPL-SCNC: 3.9 MMOL/L (ref 3.4–5.1)
RBC # BLD: 5.44 MILLION/MCL (ref 4.5–5.9)
SODIUM SERPL-SCNC: 138 MMOL/L (ref 135–145)
WBC # BLD: 5.3 THOUSAND/MCL (ref 4.2–11)

## 2017-08-10 PROCEDURE — 71010 XR CHEST AP PORTABLE  (CPT=71010): CPT | Performed by: EMERGENCY MEDICINE

## 2017-08-10 NOTE — ED INITIAL ASSESSMENT (HPI)
Pt wants alcohol detox. Seen at [facility] yesterday but sent home and pt drank 6 pack of beer to relieve detox symptoms.

## 2017-08-10 NOTE — ED PROVIDER NOTES
Patient Seen in: [facility] Emergency Department    History   Patient presents with:  Eval-P (psychiatric)    Stated Complaint:     HPI    This is a 60-year-old male who arrives here with complaints of alcohol withdrawal, depression, alcohol abuse. Take 1 tablet (100 mg total) by mouth daily. gabapentin 300 MG Oral Cap,  Take 300 mg by mouth 3 (three) times daily. Metoprolol Tartrate (LOPRESSOR) 50 MG Oral Tab,  Take 50 mg by mouth 2 (two) times daily.  Indications: High Blood Pressure   Aspirin ( pulses bilaterally. There is no calf tenderness. There is no rash noted. There is no edema    NEURO: Alert and oriented x3. Muscle strength and sensory exam is grossly normal.  And the patient is neurologically intact with no focal findings.       ED Co which was    The EKG shows sinus tachycardia. There is no acute ST elevations or ischemic findings. The rest of the EKG including rate rhythm axis and intervals I agree with the EKG report . The rate is 125.   There is nonspecific ST changes there are no

## 2017-08-11 PROBLEM — I25.10 CORONARY ARTERY DISEASE INVOLVING NATIVE HEART WITHOUT ANGINA PECTORIS, UNSPECIFIED VESSEL OR LESION TYPE: Status: ACTIVE | Noted: 2017-08-11

## 2017-08-11 PROBLEM — F10.929 ALCOHOL INTOXICATION, WITH UNSPECIFIED COMPLICATION (HCC): Status: ACTIVE | Noted: 2017-08-11

## 2017-08-11 NOTE — H&P
[facility]  History and Physical     Jeremy Manrique Patient Status:  Emergency    3/3/1955 MRN [id number]   Location [address] Attending No att. providers found   Hosp Day # 0 PCP Diana Kaba MD     Chief • Hypertension Father    • Hypertension Mother      Allergies: No Known Allergies  Medications:    No current facility-administered medications on file prior to encounter.    Current Outpatient Prescriptions on File Prior to Encounter:  lisinopril 5 MG Or GLU  161*   BUN  9   CREATSERUM  0.82   CA  8.9   ALB  3.4*   NA  127*   K  3.9   CL  92*   CO2  18.0*   ALKPHO  125*   AST  45*   ALT  34   BILT  0.6   TP  7.4     No results for input(s): PTP, INR in the last 72 hours.   No results for input(s): TROP, C

## 2017-08-11 NOTE — CM/SW NOTE
SW received order for ETOH/substance abuse, [facility] psych liaison has been consulted and will address.

## 2017-08-11 NOTE — ED NOTES
Call to [facility] for update: per CARY Tong [facility] dr. Gerri Haines would prefer to have this pt admitted medically inpt due to vital signs. ermd updated awaiting new orders.

## 2017-08-11 NOTE — PROGRESS NOTES
[facility]  Progress note     Asia Ramirez Patient Status:  Emergency    3/3/1955 MRN [id number]   Location [address] [facility] Attending No att. providers found   Hosp Day # 0 PCP Pedro Gan MD     Chief Complai acidosis secondary to above  1. resolved   5. Hyperglycemia  1. Check A1c   6. Hypertension  1. Cont home meds  7. Coronary artery disease- no acute issue   1. Cont home meds  8. Depression  1. SSRI  9. DL  1.   10. MOE  1.  MOE protocol     Not ready for d

## 2017-08-11 NOTE — PAYOR COMM NOTE
--------------  ADMISSION REVIEW       8/10    ED LATE           Patient presents with:  Eval-P (psychiatric)     Stated Complaint:      HPI     This is a 27-year-old male who arrives here with complaints of alcohol withdrawal, depression, alcohol abuse. normal limits   DRUG SCREEN 7 W/CONFIRMATION, URINE - Normal     Narrative:      Results of the Urine Drug Screen should be used only for medical purposes.    RAINBOW DRAW BLUE   RAINBOW DRAW GOLD   RAINBOW DRAW LAVENDER   RAINBOW DRAW LIGHT GREEN

## 2017-08-11 NOTE — PLAN OF CARE
Patient A&O x4, no c/o pain, lungs clear but patient MOE, snores and coughs to catch breath while sleeping. Patient comes to [facility] because he was depressed about being laid off a couple weeks back and started to drink.   He went to another hospital yester

## 2017-08-11 NOTE — ED NOTES
natachad updated pt on new orders: awaiting fluids from pharmacy and ativan received per order. Pt to transfer to [facility] after banana bag completed.

## 2017-08-12 NOTE — PLAN OF CARE
ANXIETY    • Will report anxiety at manageable levels Adequate for Discharge        CARDIOVASCULAR - ADULT    • Maintains optimal cardiac output and hemodynamic stability Adequate for Discharge        DRUG ABUSE/DETOX    • Will have no detox symptoms and w

## 2017-08-12 NOTE — PROGRESS NOTES
Patient feeling fine at the moment. Vitals stable. Exam benign; however, did get IV ativan this morning; will give dose of librium at noon and reassess CIWA until 4pm.  If no longer triggering CIWA, then will d/c home.     Mark Samano MD  [facility]

## 2017-08-12 NOTE — PLAN OF CARE
PT. Is Ax4, anxious, but cooperative. No allergies, full code, MOE, cardiac diet, Ciwa, q2. CPAP, Tele: NS-ST, continent of bowel and bladder. Up ad julieta. PIV: .9/125. Monitoring B/P. All other VSS. Will continue to monitor.   Bed is in lowest positio

## 2017-08-12 NOTE — PROGRESS NOTES
NURSING DISCHARGE NOTE    Discharged Home via Wheelchair. Accompanied by Family member  Belongings Taken by patient/family.     Voiced understanding of all discharge paperwork

## 2017-08-12 NOTE — RESPIRATORY THERAPY NOTE
MOE - Equipment Use Daily Summary:                  . Set Mode:                . Usage in hours:                . 90% Pressure (EPAP) level:                . 90% Insp. Pressure (IPAP): Huber Goetz AHI:                .  Supplemental Oxygen:

## 2017-08-12 NOTE — BH PROGRESS NOTE
Pt was seen and evaluated by [facility] when he was in ER. Pt will  Be a reassessment when med clear if he still wishes to have [facility] services.

## 2017-08-13 NOTE — DISCHARGE SUMMARY
[facility]  DISCHARGE SUMMARY     Jeremy Manrique Patient Status:  Observation    3/3/1955 MRN [id number]   [facility] 3NE-A Attending No att. providers found   Hosp Day # 0 PCP Diana Kaba MD     Date of Admission: 8/10/ Denies abdominal pain, melena or hematochezia. He denies dysuria hematuria. He denies any falls or trauma.       Brief Synopsis: admitted for alcohol detox.   Was on CIWA protocol and required overall relatively small amounts of benzos to control symptoms Tabs  · Thiamine HCl 100 MG Tabs         Follow-up appointment:   No follow-up provider specified. Vital signs:  Pulse:  [] 108  Resp:  [16] 16  BP: (131-149)/(88-98) 149/98    Physical Exam:    General: No acute distress. Alert and oriented x 3.

## 2017-08-17 NOTE — ED NOTES
Pt reports last drink at about 21:00. Pt was inpatient last week. He thought he was being admitted to [facility] for detox but was admitted inpatient instead due to his blood pressure.   Pt reports that he is prescribed blood pressure medication but has not take

## 2017-08-17 NOTE — BH LEVEL OF CARE ASSESSMENT
Level of Care Assessment Note  General Questions  Why are you here?: Pt is a 65 yr old male who came to the ER [facility] via ambulance seeking alcohol detox. Pt states \"I'm tired of it. It's bothering my kids. I can't keep up on my daily chores.  It has to e Source  Referral Source: [facility]: [facility]  Person/Contact Name: [facility]     Suicide Risk  Current/Recent Suicidal Ideation: No  Current/Recent/Future Suicide Plan: No  Current/Recent/Future Suicidal Intent: No  Current/Recent Suicide R pt reports current anxiety; pt reports hx of panic attacks, denies any recently  Trauma Reaction: Other (pt deneid)  Bipolar Symptoms: No problems reported or observed  Sleep Pattern: Sleeps all night  Number of Sleep Hours: 4.5 Hours (4-5 hrs)  Use of Sle    Functional Impairment  Currently Attending School: No  Employment Status: Unemployed  Job Issues:  Other (comment) (laid off a month ago)  Concerns/Conflicts with Social Relationships: No  Decreased Functional Ability: Other (comment) (pt denied then l lately. Pt denies SI/HI.  Pt has a hx of inpt detox at [facility] and [facility]. Per ER MD, pt came to the ER seeking detox on 8/10/17 and was admitted to the medical floor from 8/10-8/12/17 for his blood pressure.      Level of Care Recommendations  Consulted with

## 2017-08-17 NOTE — ED INITIAL ASSESSMENT (HPI)
Pt to ED brought by EMS for alcohol abuse and Pt requesting for detox. Pt claims he has been drinking everyday for the past 2-3 wks. Pt also states he drinks 6-12 beers/day. Last drink was this PM, as stated.

## 2017-08-17 NOTE — ED NOTES
Report given to [facility] EMT's. Pt taken off cardiac monitor, belongings given to ambulance staff. Pt transported to [facility] via ambulance.

## 2017-08-17 NOTE — ED NOTES
Report called to Marcus TOWNSEND #[id number] at [facility]. Pt ok to go to [facility] via ambulance.

## 2017-10-03 ENCOUNTER — APPOINTMENT (OUTPATIENT)
Dept: GENERAL RADIOLOGY | Facility: HOSPITAL | Age: 62
End: 2017-10-03
Attending: EMERGENCY MEDICINE
Payer: COMMERCIAL

## 2017-10-03 PROCEDURE — 71010 XR CHEST AP PORTABLE  (CPT=71010): CPT | Performed by: EMERGENCY MEDICINE

## 2017-10-03 NOTE — ED INITIAL ASSESSMENT (HPI)
ETOH.  Pt states his last drink was prior to arrival, can't remember how much he drinks. Pt unsure if he wants detox.

## 2017-10-04 NOTE — ED PROVIDER NOTES
Patient Seen in: [facility] Emergency Department    History   Patient presents with:  Alcohol Intoxication (neurologic)    Stated Complaint: ETOH    HPI    59-year-old white male who presents emergency room today for complaint of alcohol intoxicatio except as otherwise stated in HPI.     Physical Exam   ED Triage Vitals  BP: 132/97 [10/03/17 1742]  Pulse: 82 [10/03/17 1714]  Resp: 24 [10/03/17 1714]  Temp: 99.7 °F (37.6 °C) [10/03/17 1714]  Temp src: Temporal [10/03/17 1714]  SpO2: 91 % [10/03/17 1714] The following orders were created for panel order CBC WITH DIFFERENTIAL WITH PLATELET.   Procedure                               Abnormality         Status                     ---------                               -----------         ------

## 2017-10-05 ENCOUNTER — HOSPITAL (OUTPATIENT)
Dept: OTHER | Age: 62
End: 2017-10-05
Attending: EMERGENCY MEDICINE

## 2017-10-05 LAB
ALBUMIN SERPL-MCNC: 3.6 GM/DL (ref 3.6–5.1)
ALBUMIN/GLOB SERPL: 0.8 {RATIO} (ref 1–2.4)
ALP SERPL-CCNC: 129 UNIT/L (ref 45–117)
ALT SERPL-CCNC: 30 UNIT/L
ANALYZER ANC (IANC): NORMAL
ANION GAP SERPL CALC-SCNC: 16 MMOL/L (ref 10–20)
AST SERPL-CCNC: 31 UNIT/L
BASOPHILS # BLD: 0 THOUSAND/MCL (ref 0–0.3)
BASOPHILS NFR BLD: 0 %
BILIRUB SERPL-MCNC: 0.5 MG/DL (ref 0.2–1)
BUN SERPL-MCNC: 5 MG/DL (ref 6–20)
BUN/CREAT SERPL: 8 (ref 7–25)
CALCIUM SERPL-MCNC: 8.6 MG/DL (ref 8.4–10.2)
CHLORIDE: 101 MMOL/L (ref 98–107)
CO2 SERPL-SCNC: 25 MMOL/L (ref 21–32)
CREAT SERPL-MCNC: 0.65 MG/DL (ref 0.67–1.17)
DIFFERENTIAL METHOD BLD: NORMAL
EOSINOPHIL # BLD: 0.2 THOUSAND/MCL (ref 0.1–0.5)
EOSINOPHIL NFR BLD: 3 %
ERYTHROCYTE [DISTWIDTH] IN BLOOD: 13.2 % (ref 11–15)
ETHANOL SERPL-MCNC: 289 MG/DL
GLOBULIN SER-MCNC: 4.8 GM/DL (ref 2–4)
GLUCOSE SERPL-MCNC: 125 MG/DL (ref 65–99)
HEMATOCRIT: 48.2 % (ref 39–51)
HGB BLD-MCNC: 17 GM/DL (ref 13–17)
LYMPHOCYTES # BLD: 1.5 THOUSAND/MCL (ref 1–4)
LYMPHOCYTES NFR BLD: 28 %
MCH RBC QN AUTO: 31.5 PG (ref 26–34)
MCHC RBC AUTO-ENTMCNC: 35.3 GM/DL (ref 32–36.5)
MCV RBC AUTO: 89.3 FL (ref 78–100)
MONOCYTES # BLD: 0.5 THOUSAND/MCL (ref 0.3–0.9)
MONOCYTES NFR BLD: 9 %
NEUTROPHILS # BLD: 3.2 THOUSAND/MCL (ref 1.8–7.7)
NEUTROPHILS NFR BLD: 60 %
NEUTS SEG NFR BLD: NORMAL %
PERCENT NRBC: NORMAL
PLATELET # BLD: 269 THOUSAND/MCL (ref 140–450)
POTASSIUM SERPL-SCNC: 4.3 MMOL/L (ref 3.4–5.1)
PROT SERPL-MCNC: 8.4 GM/DL (ref 6.4–8.2)
RBC # BLD: 5.4 MILLION/MCL (ref 4.5–5.9)
SODIUM SERPL-SCNC: 138 MMOL/L (ref 135–145)
WBC # BLD: 5.4 THOUSAND/MCL (ref 4.2–11)

## 2017-10-07 LAB
AMPHETAMINES UR QL SCN>500 NG/ML: NEGATIVE
ANALYZER ANC (IANC): NORMAL
ANION GAP SERPL CALC-SCNC: 17 MMOL/L (ref 10–20)
BARBITURATES UR QL SCN>200 NG/ML: NEGATIVE
BASOPHILS # BLD: 0 THOUSAND/MCL (ref 0–0.3)
BASOPHILS NFR BLD: 0 %
BENZODIAZ UR QL SCN>200 NG/ML: NEGATIVE
BUN SERPL-MCNC: 6 MG/DL (ref 6–20)
BUN/CREAT SERPL: 12 (ref 7–25)
BZE UR QL SCN>150 NG/ML: NEGATIVE
CALCIUM SERPL-MCNC: 7.8 MG/DL (ref 8.4–10.2)
CANNABINOIDS UR QL SCN>50 NG/ML: NEGATIVE
CHLORIDE: 93 MMOL/L (ref 98–107)
CO2 SERPL-SCNC: 25 MMOL/L (ref 21–32)
CREAT SERPL-MCNC: 0.51 MG/DL (ref 0.67–1.17)
DIFFERENTIAL METHOD BLD: NORMAL
EOSINOPHIL # BLD: 0.1 THOUSAND/MCL (ref 0.1–0.5)
EOSINOPHIL NFR BLD: 1 %
ERYTHROCYTE [DISTWIDTH] IN BLOOD: 12.7 % (ref 11–15)
ETHANOL SERPL-MCNC: 397 MG/DL
GLUCOSE BLDC GLUCOMTR-MCNC: 119 MG/DL (ref 65–99)
GLUCOSE BLDC GLUCOMTR-MCNC: 166 MG/DL (ref 65–99)
GLUCOSE SERPL-MCNC: 158 MG/DL (ref 65–99)
GLYCOHEMOGLOBIN: 6.4 % (ref 4.5–5.6)
HEMATOCRIT: 40.4 % (ref 39–51)
HGB BLD-MCNC: 14.4 GM/DL (ref 13–17)
LYMPHOCYTES # BLD: 1.5 THOUSAND/MCL (ref 1–4)
LYMPHOCYTES NFR BLD: 26 %
MAGNESIUM SERPL-MCNC: 1.9 MG/DL (ref 1.7–2.4)
MCH RBC QN AUTO: 31 PG (ref 26–34)
MCHC RBC AUTO-ENTMCNC: 35.6 GM/DL (ref 32–36.5)
MCV RBC AUTO: 87.1 FL (ref 78–100)
MONOCYTES # BLD: 0.5 THOUSAND/MCL (ref 0.3–0.9)
MONOCYTES NFR BLD: 9 %
NEUTROPHILS # BLD: 3.8 THOUSAND/MCL (ref 1.8–7.7)
NEUTROPHILS NFR BLD: 64 %
NEUTS SEG NFR BLD: NORMAL %
OPIATES UR QL SCN>300 NG/ML: NEGATIVE
PCP UR QL SCN>25 NG/ML: NEGATIVE
PERCENT NRBC: NORMAL
PLATELET # BLD: 254 THOUSAND/MCL (ref 140–450)
POTASSIUM SERPL-SCNC: 3.7 MMOL/L (ref 3.4–5.1)
RBC # BLD: 4.64 MILLION/MCL (ref 4.5–5.9)
SODIUM SERPL-SCNC: 131 MMOL/L (ref 135–145)
TROPONIN I SERPL HS-MCNC: <0.02 NG/ML
TSH SERPL-ACNC: 0.95 MCUNIT/ML (ref 0.35–5)
WBC # BLD: 5.9 THOUSAND/MCL (ref 4.2–11)

## 2017-10-08 ENCOUNTER — HOSPITAL (OUTPATIENT)
Dept: OTHER | Age: 62
End: 2017-10-08
Attending: INTERNAL MEDICINE

## 2017-10-08 ENCOUNTER — DIAGNOSTIC TRANS (OUTPATIENT)
Dept: OTHER | Age: 62
End: 2017-10-08

## 2017-10-08 LAB
ALBUMIN SERPL-MCNC: 3.3 GM/DL (ref 3.6–5.1)
ALP SERPL-CCNC: 124 UNIT/L (ref 45–117)
ALT SERPL-CCNC: 26 UNIT/L
ANALYZER ANC (IANC): ABNORMAL
ANION GAP SERPL CALC-SCNC: 18 MMOL/L (ref 10–20)
APTT PPP: 27 SECONDS (ref 22–30)
APTT PPP: NORMAL S
AST SERPL-CCNC: 34 UNIT/L
BASOPHILS # BLD: 0 THOUSAND/MCL (ref 0–0.3)
BASOPHILS NFR BLD: 0 %
BILIRUB CONJ SERPL-MCNC: 0.3 MG/DL (ref 0–0.2)
BILIRUB SERPL-MCNC: 0.8 MG/DL (ref 0.2–1)
BUN SERPL-MCNC: 10 MG/DL (ref 6–20)
BUN/CREAT SERPL: 19 (ref 7–25)
CALCIUM SERPL-MCNC: 8.8 MG/DL (ref 8.4–10.2)
CHLORIDE: 96 MMOL/L (ref 98–107)
CO2 SERPL-SCNC: 24 MMOL/L (ref 21–32)
CREAT SERPL-MCNC: 0.52 MG/DL (ref 0.67–1.17)
DIFFERENTIAL METHOD BLD: ABNORMAL
EOSINOPHIL # BLD: 0 THOUSAND/MCL (ref 0.1–0.5)
EOSINOPHIL NFR BLD: 0 %
ERYTHROCYTE [DISTWIDTH] IN BLOOD: 12.7 % (ref 11–15)
GLUCOSE BLDC GLUCOMTR-MCNC: 145 MG/DL (ref 65–99)
GLUCOSE BLDC GLUCOMTR-MCNC: 183 MG/DL (ref 65–99)
GLUCOSE BLDC GLUCOMTR-MCNC: 201 MG/DL (ref 65–99)
GLUCOSE BLDC GLUCOMTR-MCNC: 217 MG/DL (ref 65–99)
GLUCOSE SERPL-MCNC: 159 MG/DL (ref 65–99)
HEMATOCRIT: 41.5 % (ref 39–51)
HGB BLD-MCNC: 14.9 GM/DL (ref 13–17)
INR PPP: 1.1
LYMPHOCYTES # BLD: 0.8 THOUSAND/MCL (ref 1–4)
LYMPHOCYTES NFR BLD: 15 %
MAGNESIUM SERPL-MCNC: 2.2 MG/DL (ref 1.7–2.4)
MCH RBC QN AUTO: 31.4 PG (ref 26–34)
MCHC RBC AUTO-ENTMCNC: 35.9 GM/DL (ref 32–36.5)
MCV RBC AUTO: 87.4 FL (ref 78–100)
MONOCYTES # BLD: 0.1 THOUSAND/MCL (ref 0.3–0.9)
MONOCYTES NFR BLD: 1 %
NEUTROPHILS # BLD: 4.5 THOUSAND/MCL (ref 1.8–7.7)
NEUTROPHILS NFR BLD: 84 %
NEUTS SEG NFR BLD: ABNORMAL %
PERCENT NRBC: ABNORMAL
PHOSPHATE SERPL-MCNC: 3.9 MG/DL (ref 2.4–4.7)
PLATELET # BLD: 212 THOUSAND/MCL (ref 140–450)
POTASSIUM SERPL-SCNC: 5.1 MMOL/L (ref 3.4–5.1)
PROT SERPL-MCNC: 7.5 GM/DL (ref 6.4–8.2)
PROTHROMBIN TIME: 11.5 SECONDS (ref 9.7–11.8)
PROTHROMBIN TIME: NORMAL
RBC # BLD: 4.75 MILLION/MCL (ref 4.5–5.9)
SODIUM SERPL-SCNC: 133 MMOL/L (ref 135–145)
TROPONIN I SERPL HS-MCNC: <0.02 NG/ML
WBC # BLD: 5.4 THOUSAND/MCL (ref 4.2–11)

## 2017-10-09 LAB
GLUCOSE BLDC GLUCOMTR-MCNC: 146 MG/DL (ref 65–99)
GLUCOSE BLDC GLUCOMTR-MCNC: 152 MG/DL (ref 65–99)
GLUCOSE BLDC GLUCOMTR-MCNC: 161 MG/DL (ref 65–99)
GLUCOSE BLDC GLUCOMTR-MCNC: 192 MG/DL (ref 65–99)

## 2017-10-10 LAB
ANION GAP SERPL CALC-SCNC: 12 MMOL/L (ref 10–20)
BUN SERPL-MCNC: 28 MG/DL (ref 6–20)
BUN/CREAT SERPL: 39 (ref 7–25)
CALCIUM SERPL-MCNC: 8.4 MG/DL (ref 8.4–10.2)
CHLORIDE: 94 MMOL/L (ref 98–107)
CO2 SERPL-SCNC: 28 MMOL/L (ref 21–32)
CREAT SERPL-MCNC: 0.71 MG/DL (ref 0.67–1.17)
GLUCOSE BLDC GLUCOMTR-MCNC: 121 MG/DL (ref 65–99)
GLUCOSE BLDC GLUCOMTR-MCNC: 128 MG/DL (ref 65–99)
GLUCOSE BLDC GLUCOMTR-MCNC: 151 MG/DL (ref 65–99)
GLUCOSE SERPL-MCNC: 187 MG/DL (ref 65–99)
POTASSIUM SERPL-SCNC: 3.9 MMOL/L (ref 3.4–5.1)
SODIUM SERPL-SCNC: 130 MMOL/L (ref 135–145)

## 2017-10-19 ENCOUNTER — APPOINTMENT (OUTPATIENT)
Dept: GENERAL RADIOLOGY | Facility: HOSPITAL | Age: 62
DRG: 897 | End: 2017-10-19
Attending: INTERNAL MEDICINE
Payer: COMMERCIAL

## 2017-10-19 PROBLEM — F10.239 ALCOHOL DEPENDENCE WITH WITHDRAWAL WITH COMPLICATION (HCC): Status: ACTIVE | Noted: 2017-10-19

## 2017-10-19 PROBLEM — I25.10 CORONARY ARTERY DISEASE: Status: ACTIVE | Noted: 2017-08-11

## 2017-10-19 PROBLEM — F10.920 ALCOHOLIC INTOXICATION WITHOUT COMPLICATION (HCC): Status: ACTIVE | Noted: 2017-10-19

## 2017-10-19 PROCEDURE — 71010 XR CHEST AP PORTABLE  (CPT=71010): CPT | Performed by: INTERNAL MEDICINE

## 2017-10-19 NOTE — ED NOTES
Patient told me that he was recently laid off from work and that is why he relapsed  He said that he does not want to hurt himself

## 2017-10-19 NOTE — H&P
[facility]  History and Physical     Bhargavi Poll Patient Status:  Emergency    3/3/1955 MRN [id number]   Location [address] [facility] Attending No att. providers found   Hosp Day # 0 PCP Corky Culver MD     Chief Hypertension Mother        Allergies: No Known Allergies    Medications:    No current facility-administered medications on file prior to encounter.    Current Outpatient Prescriptions on File Prior to Encounter:  folic acid 1 MG Oral Tab Take 1 tablet (1 m HGB  17.8*   MCV  88.0   PLT  243.0       Recent Labs   Lab  10/18/17   2253   GLU  135*   BUN  6*   CREATSERUM  0.82   CA  8.2*   ALB  3.6   NA  133*   K  4.4   CL  97*   CO2  26.0   ALKPHO  117   AST  54*   ALT  48   BILT  0.9   TP  8.3       No result

## 2017-10-19 NOTE — ED NOTES
MD at bedside. Discussing patient prior admissions for Detox Hx of Seizures  Plan now is to admit him to The hospital  For close observation .  Patient continues to be monitored closely   I ordered a banana bag   Ativan given patient sleeps immediately   Pl

## 2017-10-19 NOTE — ED NOTES
Pt sleeping after Zofran given. He rouses easy to verbal stimuli then goes back to sleep.  States nausea better

## 2017-10-19 NOTE — BH LEVEL OF CARE ASSESSMENT
Level of Care Assessment Note    General Questions  Why are you here?: Pt is a 65 yr old male who arrived to the ER via ambulance seeking detox from alcohol. Pt states he is here due to \"Drinking too much. I usually have a couple beers at night and relax. 2x    Referral Source  Referral Source: [place] 3: [facility]  Person/Contact Name: Diya VILLAFUERTE    Suicide Risk  Current/Recent Suicidal Ideation: No  Current/Recent/Future Suicide Plan: No  Current/Recent/Future Suicidal Intent: No  Current/Re depressed; Anxiety Symptoms: No problems reported or observed.   Panic Attacks: pt denies recent anxiety  Trauma Reaction: Other (pt denied)  Bipolar Symptoms: No problems reported or observed  Sleep Pattern: Sleeps all night  Number of Sleep Hours: [address] Functional Impairment  Currently Attending School: No  Employment Status: Unemployed  Job Issues:  Other (comment) (currently laid off)  Concerns/Conflicts with Social Relationships: No  Decreased Functional Ability: Other (comment) (pt denied)  Do y hallucinations as a withdrawal sx. Pt states he has been feeling \"a little\" depressed and denied sx of depression to this writer. Pt denies anxiety other than experiencing it currently as a withdrawal sx.  Per EMS report, pt's son reported that pt said \"

## 2017-10-19 NOTE — ED NOTES
Received bedside report from [address] [place] [address] patient is sleeping but awakens easily  BP is improving 128/99  Plan: waiting for his levels to be >100 before ABHIJEET will reassess him

## 2017-10-19 NOTE — CONSULTS
Critical Care Consult     Assessment / Plan:  1. Alcohol intoxication / withdrawal   - CIWA  - benzos as needed  - MVI, thiamine, folic acid  2. Hypoxia - likely due to MOE  - supplemental oxygen. CPAP as needed if mental status improves  - CXR  3.  HTN  - AmLODIPine Besylate 10 MG Oral Tab Take 10 mg by mouth daily.  Disp:  Rfl:  Past Week at Unknown time   Aspirin (BABY ASPIRIN) 81 MG Oral Chew Tab 1 TABLET DAILY Disp:  Rfl:  Past Week at Unknown time   Sertraline HCl 100 MG Oral Tab Take 1 tablet (100 mg 10/19/17  1300 10/19/17  1314 10/19/17  1400   BP:  (!) 149/115  (!) 156/108   BP Location:       Pulse: 96 115 114 107   Resp: 25 25  28   Temp:       TempSrc:       SpO2: (!) 88% 95% 92% 90%   Weight:       O2: 6L    General: NAD  HEENT: OP clear  Neck:

## 2017-10-19 NOTE — ED NOTES
Rounded on pt, he is seen sound asleep, appears well rested. /115, 143/106. HR- 90's to low 100's.   informed of BP and HR

## 2017-10-19 NOTE — ED NOTES
Discussed CIWA result with Dr Mindy Chicas 1mg at this time patient is sleeping and then awakens anxious I will continue to monitor the CIWA   Breakfast ordered

## 2017-10-19 NOTE — BH PROGRESS NOTE
Went to see the pt and he is denying any suicidal thoughts. He said, he started drinking because of his recent issues with his job. Report stated he was laid off. He expressed concerns about money issues to this liaison.   The pt states he has been in cd

## 2017-10-19 NOTE — ED PROVIDER NOTES
Patient continued to have progressive withdrawal symptoms here. CIWA 12 despite a total of 6 mg of Ativan being given. Decision was made to admit patient to ICU.   Case was discussed with [facility] pulmonologist.  Critical care time was

## 2017-10-19 NOTE — ED NOTES
Pt is to be reassessed by [facility] when he is clinically sober to determine level of care.

## 2017-10-20 NOTE — RESPIRATORY THERAPY NOTE
MOE Equipment Usage Summary :            Set Mode :AUTO CPAP W FLEX          Usage in Hours:10;54          90% Pressure (EPAP) : 8           90% Insp Pressure (IPAP);           AHI : 2.1          Supplemental Oxygen :   4   LPM

## 2017-10-20 NOTE — PROGRESS NOTES
[facility]  Progress Note     Cecily Miranda Patient Status:  Inpatient    3/3/1955 MRN [id number]   [facility] 4SW-A Attending Montana Cruz MD   Hosp Day # 1 PCP Santi Burrell MD     Chief Complaint: Etoh withdrawals Metoprolol Tartrate  50 mg Oral 2x Daily(Beta Blocker)   • pregabalin  100 mg Oral BID   • Sertraline HCl  100 mg Oral Daily       ASSESSMENT / PLAN:     1.  Alcohol intoxication with early signs of withdrawal - CIWA protocol - switch to diazepam PO, stable

## 2017-10-20 NOTE — PROGRESS NOTES
Critical Care Progress Note     Assessment / Plan:  1. Alcohol intoxication / withdrawal   - CIWA  - benzos as needed  - MVI, thiamine, folic acid  2. MOE  - CPAP  3. HTN  - amlodipine  - clonodine prn  4. FEN  - regular diet  5. PPx  - Lovenox  6.  Dispo

## 2017-10-20 NOTE — PLAN OF CARE
PAIN - ADULT    • Verbalizes/displays adequate comfort level or patient's stated pain goal Completed        RESPIRATORY - ADULT    • Achieves optimal ventilation and oxygenation Completed        Risk for Violence-Violent Restraints/Seclusion    • Patient w

## 2017-10-20 NOTE — PLAN OF CARE
Risk for Violence-Violent Restraints/Seclusion    • Patient will not express any violent or self-destructive behaviors Progressing            Received pt drowsy oriented x3. Pt on 3L NC increased to 6L to keep O2 sat >90 while pt is sleeping.  HR ST/SR, hyp

## 2017-10-20 NOTE — PAYOR COMM NOTE
--------------  ADMISSION REVIEW     Payor: KRISTEL PP  Subscriber #:  [id number]  Authorization Number: [id number]      Admit date: 10/19/17  Admit time: 5       Admitting Physician: Ann Marie Rayo MD  Attending Physician:  Ann Marie Rayo MD  Primary Car All other systems reviewed and negative except as noted above. PSFH elements reviewed from today and agreed except as otherwise stated in HPI.     Physical Exam   ED Triage Vitals  BP: (!) 165/109 [10/18/17 2305]  Pulse: 113 [10/18/17 2245]  Resp: 16 [10 Please view results for these tests on the individual orders.    COMP METABOLIC PANEL (14)   RAINBOW DRAW BLUE   RAINBOW DRAW LAVENDER   RAINBOW DRAW LIGHT GREEN   RAINBOW DRAW GOLD   CBC W/ DIFFERENTIAL     EKG    Rate, intervals and axes as noted on EKG R Filed:  10/19/2017 10:52 AM Date of Service:  10/19/2017 10:51 AM Status:  Signed    :  Kishan Richardson MD (Physician)       Patient continued to have progressive withdrawal symptoms here. CIWA 12 despite a total of 6 mg of Ativan being given. History of Present Illness: Ra Morris is a 65year old male with  history of chronic alcoholism and multiple withdrawals presents earlier today to the ER acutely intoxicated.   Patient initially was more sleepy but earlier this morning started becoming m Lab  10/18/17   2423   GLU  135*   BUN  6*   CREATSERUM  0.82   CA  8.2*   ALB  3.6   NA  133*   K  4.4   CL  97*   CO2  26.0   ALKPHO  117   AST  54*   ALT  48   BILT  0.9   TP  8.3       No results for input(s): PTP, INR in the last 72 hours.     No resul LORazepam (ATIVAN) injection 1 mg     Date Action Dose Route User    10/19/2017 1228 Given 1 mg Intravenous Sveta Chou, RN      LORazepam (ATIVAN) injection 1 mg     Date Action Dose Route User    10/19/2017 1001 Given 1 mg Intravenous Sharron Real 10/20/2017 0800 Rate/Dose Verify (none) Intravenous Aline Ignacio, CARY    10/20/2017 1389 New Bag (none) Intravenous Camryn Eubanks RN    10/19/2017 2001 New Bag (none) Intravenous Camryn Eubanks RN    10/19/2017 1331 New Bag (none) Intravenous Turn 10/19/17 0235 -- 124 26  161/121 96 % -- EC   10/19/17 0200 -- 122 25  158/112 95 % -- EC   10/19/17 0134 -- 122 24  160/110 95 % -- EC   10/19/17 0108 -- 115 22  165/120 97 % -- EC   10/19/17 0022 -- 104 19 122/100 97 % -- EC     Device        RemStar - C

## 2017-10-20 NOTE — PHYSICAL THERAPY NOTE
PHYSICAL THERAPY QUICK EVALUATION - INPATIENT    Room Number: 468/468-A  Evaluation Date: 10/20/2017  Presenting Problem: ETOH withdrawal  Physician Order: PT Eval and Treat     History related to current admission: Pt is 65year old male admitted on 10/ BEARING RESTRICTION  Weight Bearing Restriction: None                PAIN ASSESSMENT  Rating: Unable to rate  Location: L hip during gait      RANGE OF MOTION AND STRENGTH ASSESSMENT  Upper extremity ROM and strength are within functional limits - r should dizziness upon sitting. Pt instructed in proper hand placement and completed sit<>stand to rolling walker w/ modified independence. Pt completed gait 100'x1 w/ rolling walker and modified independence.   Stood and completed modified Marks balance exam - se was completed and this patient is demonstrating a 11% degree of impairment in mobility. Research supports that patients with this level of impairment may benefit from home.    Based on this evaluation, patient's clinical presentation is stable and overall e

## 2017-10-20 NOTE — PROGRESS NOTES
[facility] Re[facility] from ICU. Patient stable. C/o anxiety, Valium given per MAR. No complaints of pain at this time. Will continue to monitor. 1738: Pt c/o anxiety after PRN Valium given. CIWA scoring at 3. MD notified.

## 2017-10-21 NOTE — RESPIRATORY THERAPY NOTE
MOE - Equipment Use Daily Summary:  · Set Mode CFLEX  · Usage in hours: 9:06  · 90% Pressure (EPAP) level: 10.0  · 90% Insp Pressure (IPAP):   · AHI: 12.9  · Supplemental Oxygen:   · Comments:

## 2017-10-21 NOTE — PLAN OF CARE
ANXIETY    • Will report anxiety at manageable levels Progressing        Patient/Family Goals    • Patient/Family Long Term Goal Progressing    • Patient/Family Short Term Goal Progressing          AOX4 telemetry NSR  CIWA Protocol Q2 hours  No complaints

## 2017-10-21 NOTE — PROGRESS NOTES
NURSING DISCHARGE NOTE    Discharged Home via Wheelchair. Accompanied by RN  Belongings Taken by patient/family. Pt wheeled down and put into son's car by this rn. Iv site removed prior to d/c.  Pt verbalized understanding of discharge instructions an

## 2017-10-21 NOTE — PLAN OF CARE
ANXIETY    • Will report anxiety at manageable levels Completed        Patient/Family Goals    • Patient/Family Long Term Goal Completed    • Patient/Family Short Term Goal Completed

## 2017-10-21 NOTE — PROGRESS NOTES
[facility]  Progress Note     Arvind Bailey Patient Status:  Inpatient    3/3/1955 MRN [id number]   [facility] 4SW-A Attending Angelica Clement MD   Hosp Day # 2 PCP Beth Kohler MD     Chief Complaint: Etoh withdrawals multivitamin with minerals  1 tablet Oral Daily   • folic acid  1 mg Oral Daily   • AmLODIPine Besylate  10 mg Oral Daily   • aspirin  81 mg Oral Daily   • Metoprolol Tartrate  50 mg Oral 2x Daily(Beta Blocker)   • pregabalin  100 mg Oral BID   • Sertralin

## 2017-10-21 NOTE — PROGRESS NOTES
SHEILAG Critical Care    Pt has been transferred out of the ICU, we will sign off. Call us if we can be of assistance. Corie Ignacio M.D.   Pulmonary/Critical Care

## 2017-10-22 ENCOUNTER — HOSPITAL (OUTPATIENT)
Dept: OTHER | Age: 62
End: 2017-10-22
Attending: EMERGENCY MEDICINE

## 2017-10-22 NOTE — DISCHARGE SUMMARY
[facility]  DISCHARGE SUMMARY     Jennifer Scherer Patient Status:  Inpatient    3/3/1955 MRN [id number]   [facility] 3NE-A Attending No att. providers found   Hosp Day # 2 PCP Titi Santana MD     Date of Admission: 10/18/2 to diazepam still had some complaints of anxiety and was started on Seroquel. He recovered well but still refused all inpatient rehab services when sober. Patient made quite a few excuses why he could not go directly to inpatient rehab.   Information was Refills:  0     pregabalin 100 MG Caps  Commonly known as:  LYRICA      Take 100 mg by mouth 2 (two) times daily. Refills:  0     Sertraline HCl 100 MG Tabs  Commonly known as:  ZOLOFT      Take 1 tablet (100 mg total) by mouth daily.    Quantity:  30 tab

## 2017-10-29 PROBLEM — IMO0002 ALCOHOL USE DISORDER: Status: ACTIVE | Noted: 2017-10-29

## 2017-10-29 NOTE — ED NOTES
Report called to Brianna Fuentes RN, at [facility]. Call to [facility] ambulance for transportation. ETA 15 minutes.

## 2017-10-29 NOTE — ED PROVIDER NOTES
Patient Seen in: [facility] Emergency Department    History   Patient presents with:  Eval-P (psychiatric)    Stated Complaint: requesting detox    HPI    Patient is a 28-year-old male comes emergency room for evaluation of alcohol abuse.   Patient i detox  Other systems are as noted in HPI. Constitutional and vital signs reviewed. All other systems reviewed and negative except as noted above. PSFH elements reviewed from today and agreed except as otherwise stated in HPI.     Physical Exam   ED normal limits   CBC W/ DIFFERENTIAL - Abnormal; Notable for the following:     Monocyte Absolute 0.77 (*)     All other components within normal limits   MAGNESIUM - Normal   CBC WITH DIFFERENTIAL WITH PLATELET    Narrative:      The following orders were c

## 2017-10-29 NOTE — ED NOTES
Upon arrival, Dr. Hay Baldwin was in room with patient, who asked if he could have something for anxiety. States last drink was one hour PTA. Dr. Hay Baldwin told patient can have something for anxiety after blood work came back.   Patient asking nurse multiple

## 2017-10-29 NOTE — ED INITIAL ASSESSMENT (HPI)
Arrives via [facility] for detox. Son called EMS because was unable to get patient into car d/t unsteady gait. Reportedly has been drinking for a week. EMS states transports patient once a week because of stating wants detox.   States has been h

## 2017-10-29 NOTE — BH LEVEL OF CARE ASSESSMENT
Level of Care Assessment Note    General Questions  Why are you here?: Pt is a 65 yr old male who arrived to the ER via ambulance seeking detox from alcohol. Pt states, \"I've been driniking for a week and promised my son I'd come in for detox. \" Pt states pt's son was unable to get pt in his car because of pt's unsteady gait. Per ER RN note: \"Arrives with soiled clothing, malodorous, and dirty. \" Pt states he wants detox.     Family Collateral  Family Collateral: none available  Reason Patient is Here Today drinks  Depression Symptoms: Feelings of worthlessness; Feelings of hopelessess; Feelings of helplessness;Isolative; Loss of interest;Impaired concentration;Sleep disturbance; Appetite change; Change in energy level  Depression Description: pt states he hasn't prescribed) in the past 30 days?: Yes  Chemical 1  Type of Other Chemical Used: Alcohol (describe)  Amount/Frequency: pt reports drinking for the past week; typically drinks a 12 pack  Route: Oral  Age First Used: 17  Last Use: about 2-3 hrs ago; half marisa Alert  Exhibited Behavior : Appropriate to situation; Cooperative;Participated  Gait/Movement: Other (Comment) (derik; pt was laying on hospital bed)  Speech Characteristics: Normal rate;Normal rhythm;Normal volume  Concentration: Unimpaired  Memory: Recent m CDU  Level of Care Recommendation: Inpatient Acute Care  Unit: CDU  Inpatient Criteria: Medical detox  Behavioral Precautions: Close Observation  Medical Precautions: Seizure    Primary Psychiatric Diagnosis  Substance Related and Addictive Disorders:  Alco

## 2017-10-29 NOTE — ED NOTES
Resting, awakens easily to verbal.  States able to participate in an assessment by ABHIJEET. Call to [facility], spoke with Peg Warren, who was made aware of consult.

## 2017-10-30 ENCOUNTER — HOSPITAL ENCOUNTER (OUTPATIENT)
Dept: PHYSICAL THERAPY | Facility: HOSPITAL | Age: 62
Setting detail: THERAPIES SERIES
Discharge: HOME | End: 2017-10-30
Payer: COMMERCIAL

## 2017-10-30 PROCEDURE — 97161 PT EVAL LOW COMPLEX 20 MIN: CPT

## 2017-11-01 NOTE — PAYOR COMM NOTE
--------------  DISCHARGE REVIEW    Payor: KRISTEL NINA  Subscriber #:  [id number]  Authorization Number: [id number]      Admit date: 10/19/17  Admit time:  3777  Discharge Date: 10/21/2017  5:52 PM     Admitting Physician: Malia Boothe MD  Attending Physic Commonly known as:  NORVASC      Take 10 mg by mouth daily.    Refills:  0     BABY ASPIRIN 81 MG Chew  Generic drug:  aspirin      1 TABLET DAILY   Refills:  0     folic acid 1 MG Tabs  Commonly known as:  FOLVITE      Take 1 tablet (1 mg total) by mouth d 10/20/17 1600 -- 97 -- 155/94 -- -- AM   10/20/17 1533 -- 113 --  147/105 -- -- AM     10/20/17 0550 -- 102 --  157/102 -- -- MS   10/20/17 0500 -- 98 23 145/98 95 % -- MS   10/20/17 0400 98.4 °F (36.9 °C) 94 24 120/87 95 % -- MS   10/20/17 0300 -- 101 22

## 2018-01-13 NOTE — ED INITIAL ASSESSMENT (HPI)
Brought in by family for detox,  States has been dry for 3 months, but has been drinking a 5th of vodka a day for 2 weeks

## 2018-01-14 ENCOUNTER — APPOINTMENT (OUTPATIENT)
Dept: CV DIAGNOSTICS | Facility: HOSPITAL | Age: 63
DRG: 897 | End: 2018-01-14
Attending: INTERNAL MEDICINE
Payer: COMMERCIAL

## 2018-01-14 PROBLEM — I25.10 CORONARY ARTERY DISEASE INVOLVING NATIVE HEART WITHOUT ANGINA PECTORIS: Status: ACTIVE | Noted: 2017-08-11

## 2018-01-14 PROCEDURE — 93306 TTE W/DOPPLER COMPLETE: CPT | Performed by: INTERNAL MEDICINE

## 2018-01-14 NOTE — ED PROVIDER NOTES
While the patient does not want to talk with crisis for treatment options since [facility] for alcohol detox his tachycardia continues to stay patient does have a history of withdrawal hallucinations he is also hyperte

## 2018-01-14 NOTE — CONSULTS
[facility]  Report of Consultation    Yesenia Downs Patient Status:  Observation    3/3/1955 MRN [id number]   [facility] 3NE-A Attending Dell Brantley MD   Hosp Day # 0 PCP Giselle Peacock MD lopressor  4. Check 2D echo    History of Present Illness:  Hayley Cunha is a a(n) 65year old male with a h/o HTN and MOE (noncompliant with CPAP) presented for ETOH detox. Denies any cardiac complaints.   While asleep today had a 3 second pause clemenciasodarlinga Heparin Sodium (Porcine) 5000 UNIT/ML injection 5,000 Units, 5,000 Units, Subcutaneous, Q8H [address]  •  LORazepam (ATIVAN) tab 1 mg, 1 mg, Oral, Q1H PRN **OR** LORazepam (ATIVAN) injection 1 mg, 1 mg, Intravenous, Q1H PRN **OR** LORazepam (ATIVAN) tab 2 mg, 2 Data:  Diagnostics:  EKG: pending.   Labs:     Lab Results  Component Value Date   WBC 7.2 01/13/2018   RBC 5.50 01/13/2018   HGB 16.9 01/13/2018   HCT 46.9 01/13/2018   MCV 85.3 01/13/2018   MCH 30.7 01/13/2018   MCHC 36.0 01/13/2018   RDW 12.1 01/13/2018

## 2018-01-14 NOTE — H&P
[facility]  History and Physical     Jersey Cousin Patient Status:  Emergency    3/3/1955 MRN [id number]   Location [facility] Attending Camilo Najera MD   Hosp Day # 0 PCP Emanuel Acosta MD     Chief Complain history. He has never used smokeless tobacco. He reports that he drinks about 56.4 oz of alcohol per week . He reports that he does not use drugs.     Family History:   Family History   Problem Relation Age of Onset   • Hypertension Father    • Hypertension bilaterally. No wheezes. No rhonchi. Cardiovascular: S1, S2. Regular rate and rhythm. No murmurs, rubs or gallops. Equal pulses. Chest and Back: No tenderness or deformity. Abdomen: Soft, nontender, nondistended. Positive bowel sounds.  No rebound, gua

## 2018-01-14 NOTE — BH LEVEL OF CARE ASSESSMENT
Level of Care Assessment Note    General Questions  Why are you here?: \"I came because my son brought me here.  I dont want any treatment, I am not ready, I want to go home\" Pt denies any SI/HI   Precipitating Events: Pt states he was sober for the past 3 Suicide Risk Mitigating Factors: Pt states \"My family\"   Past Suicide Risk Collateral Provided By[de-identified] None available     Danger to Others/Property  Current/Recent Harm Toward Others: No  Past Harm Toward Others: No  Current or Past Harm Toward Animals: No Vodla daily for the past 2-3 weeks. daily a bottle of vodka. Pt was brought to [facility] ER by his son as his son was concerned about pt's excessive drinking. Pt states he is not interested in getting treatement and he does want to quit drinking.  Pt refused asse

## 2018-01-14 NOTE — ED PROVIDER NOTES
Patient Seen in: [facility] Emergency Department    History   Patient presents with:  Alcohol Intoxication (neurologic)    Stated Complaint: ETOH    HPI    Patient is a 78-year-old male who has a history of alcohol abuse.   Patient has been through r HPI.  Constitutional and vital signs reviewed. All other systems reviewed and negative except as noted above.     Physical Exam   ED Triage Vitals  BP: (!) 169/112 [01/13/18 1721]  Pulse: 104 [01/13/18 1721]  Resp: 16 [01/13/18 1721]  Temp: 97.9 °F (36 Screen should be used only for medical purposes. CBC WITH DIFFERENTIAL WITH PLATELET    Narrative: The following orders were created for panel order CBC WITH DIFFERENTIAL WITH PLATELET.   Procedure                               Abnormality         Sta

## 2018-01-14 NOTE — PROGRESS NOTES
NURSING ADMISSION NOTE      Patient admitted via Cart  Oriented to room. Safety precautions initiated. Bed in low position. Call light in reach. Admission assessment completed with patient.

## 2018-01-14 NOTE — PROGRESS NOTES
Pt was seen in the ER and refused assessment from [facility]. Stated he does not want treatment and was given referrals to other facilities. Pt does not need to be seen on med floors.

## 2018-01-15 NOTE — PROGRESS NOTES
[facility]  Cardiology Progress Note    Subjective:  No chest pain or shortness of breath. Denies any palpitations or complaints of dizziness.     Objective:  /92 (BP Location: Left arm)   Pulse 86   Temp 97.6 °F (36.4 °C) (Oral)   Resp 20   Ht grade AVB episodes but only while sleeping, likely secondary to heightened vagal tone from apneic episodes. No h/o dizziness or syncope in past.  Would d/c lopressor. No indication for pacer at the current time.   Encourage further w/u and treatment of CP

## 2018-01-15 NOTE — BH PROGRESS NOTE
Seen the pt for his etoh history. Pt states the only treatment he wants is seeing a therapist for 1 to 1 counseling. Talked with Dr. Darek Mckeon and he wants the pt to be given referrals for Dr. Jacobs Pi office.   He can f/u with the psychiatrist and therapist

## 2018-01-15 NOTE — PLAN OF CARE
Assumed patient care at 1900  Patient A&O x 4-forgetful at times  No reports of nausea   CIWA protocol with scores from 0-3 overnight-No ativan given  Seizure precautions in place  VSS with occasional increased BP  Tele-NSR  Voids per urinal  Patient did n

## 2018-01-15 NOTE — DISCHARGE SUMMARY
[facility] [place] HOSPITALIST  DISCHARGE SUMMARY     Porter Nicole Patient Status:  Observation    3/3/1955 MRN [id number]   [facility] 3NE-A Attending Raheel Cuevas MD   [address] Day # 1 PCP Elisa Miller MD     Date of Admission: 2018  D symptoms. Pt did experience significant hypoxia with sleep and this caused bradycardia and cardiac pauses as long as 5 seconds. His beta blocker was decreased and seen by EP. The importance of cpap at night was stressed.      Procedures during hospitalizati medications were sent to [address] [place], [place] [address]wy, [address] [facility] [address] [phone], [address] [address]    Phone:  [phone]   · Losartan Potassium 50 MG Tabs         Follow-up appointment:   Marisa Mustafa, [address] [facility]

## 2018-01-15 NOTE — PLAN OF CARE
NURSING DISCHARGE NOTE    Discharged home via wheelchair. Accompanied by support staff. Belongings sent home with pt. Discharge instructions, f/u appointment discussed with pt.  Pt informed script for losartan was sent to his pharmacy, needs to be

## 2018-01-15 NOTE — PLAN OF CARE
Pt alert and oriented. Anxious. Denies pain, SOB. CIWA 0-7; q2 hours. PRN IV ativan given per MAR x1. VSS. Afebrile. Pt requiring 2L O2 while sleeping; de-sats to upper 80s. Pt with 5.5 second pause while asleep; asymptomatic.   Cards, hospitalist not

## 2018-01-15 NOTE — PLAN OF CARE
Patient admitted this am for CIWA monitoring  AOx4 drowsy reports nausea and visual hallucinations at times - medicated as per order  When asleep note to have episodes of apnea - HR dropped to 40'  3L NC lungs clear; does have nonproductive cough  SR / Murleen Purdue

## 2018-01-15 NOTE — RESPIRATORY THERAPY NOTE
MOE - Equipment Use Daily Summary:  · Set Mode AFLEX  · Usage in hours: 4:00  · 90% Pressure (EPAP) level: 6.5  · 90% Insp Pressure (IPAP):   · AHI:10.0  · Supplemental Oxygen:   · Comments:

## 2018-01-15 NOTE — PAYOR COMM NOTE
--------------  ADMISSION REVIEW     Payor: [facility] PP  Subscriber #:  [id number]  Authorization Number: N/A    Admit date: N/A  Admit time: N/A       Admitting Physician: Cecily Lopez MD  Attending Physician:  Ryan Braden MD  Primary Care Physician: Vitals  BP: (!) 169/112 [01/13/18 1721]  Pulse: 104 [01/13/18 1721]  Resp: 16 [01/13/18 1721]  Temp: 97.9 °F (36.6 °C) [01/13/18 1721]  Temp src: Temporal [01/13/18 1721]  SpO2: 96 % [01/13/18 1721]  O2 Device: None (Room air) [01/13/18 1918][MB.2]    Curr signed out the oncoming emergency physician for final disposition. Disposition and Plan     Clinical TOHRJKVSEN:[VQ.0]  Alcoholic intoxication without complication ([facility] [address]  (primary encounter diagnosis)[MB. 2]    Disposition:[MB.1]  There is no disposition black \" hairs\" on the wall. He states that he is feeling even more anxious and shaky. He denies any headache, nausea, vomiting, chest pain, shortness of breath, abdominal     ASSESSMENT / PLAN:     1. Alcohol withdrawal  1. [facility] protocol  2.  [facility] / Mitchell Gottlieb (none) Intravenous Lg Calderon RN    1/14/2018 2440 New Bag (none) Intravenous Lg Calderon RN      Potassium Chloride ER (K-DUR M20) CR tab 40 mEq     Date Action Dose Route User    1/14/2018 1624 Given 40 mEq Oral Lg Calderon RN

## 2018-01-15 NOTE — CM/SW NOTE
SW received consult for access to medication. SW spoke with RN during rounds and no needs are identified at this time. RN to contact SW/CM if needs arise.

## 2018-01-20 ENCOUNTER — HOSPITAL (OUTPATIENT)
Dept: OTHER | Age: 63
End: 2018-01-20
Attending: EMERGENCY MEDICINE

## 2018-01-23 PROBLEM — R11.0 NAUSEA: Status: ACTIVE | Noted: 2018-01-23

## 2018-01-23 PROBLEM — R94.31 PROLONGED Q-T INTERVAL ON ECG: Status: ACTIVE | Noted: 2018-01-23

## 2018-01-23 NOTE — H&P
[facility] HOSPITALIST  History and Physical     Becky Padilla Patient Status:  Inpatient    3/3/1955 MRN [id number]   [facility] 3NE-A Attending Jennifer Chun MD   Hosp Day # 0 PCP Ash Haddad MD     Chief Complaint: ETOH wit Hypertension Mother        Allergies: No Known Allergies    Medications:    No current facility-administered medications on file prior to encounter.    Current Outpatient Prescriptions on File Prior to Encounter:  Losartan Potassium 50 MG Oral Tab Take 1 ta rashes or lesions. Psychiatric: Appropriate mood and affect.       Diagnostic Data:      Labs:  Recent Labs   Lab  01/22/18   2312   WBC  4.8   HGB  15.1   MCV  86.5   PLT  211.0       Recent Labs   Lab  01/22/18   2312   GLU  119*   BUN  5*   CREATSERUM

## 2018-01-23 NOTE — PLAN OF CARE
CARDIOVASCULAR - ADULT    • Absence of cardiac arrhythmias or at baseline Progressing        METABOLIC/FLUID AND ELECTROLYTES - ADULT    • Electrolytes maintained within normal limits Progressing        NEUROLOGICAL - ADULT    • Absence of seizures Progres

## 2018-01-23 NOTE — ED INITIAL ASSESSMENT (HPI)
Pt  has been feeling ill with cold symptoms today,  has been drinking alcohol, also drank half a bottle of nighttime cough and cold medicine at 3pm.  Pt  is afraid of being at home by himself, he thought he would die.  Pt denies SI/HI, pt

## 2018-01-23 NOTE — BH PROGRESS NOTE
Patient seen for the level of care assessment and then refused services. He already has the referrals from last week to f/u at least with Dr. Jonathan Gutierrez, psychiatrist and his therapist.  His nurse is aware of the plan.

## 2018-01-23 NOTE — ED PROVIDER NOTES
Patient Seen in: [facility] Emergency Department    History   Patient presents with:  Alcohol Intoxication (neurologic)    Stated Complaint:     HPI    90-VKIQ-WYU alcoholic presenting to the ER tonight because he has been on a binge lately, and is per day      Review of Systems    Positive for stated complaint:   Other systems are as noted in HPI. Constitutional and vital signs reviewed. All other systems reviewed and negative except as noted above.     Physical Exam   ED Triage Vitals [01/22/1 normal limits   CBC W/ DIFFERENTIAL - Abnormal; Notable for the following:     Monocyte Absolute 0.77 (*)     All other components within normal limits   SALICYLATE, SERUM - Normal   CBC WITH DIFFERENTIAL WITH PLATELET    Narrative:      The following order specialist for local care for consultation because of the patient's admission to the ICU. MDM     Patient requires admission to hospital for further IV management of alcohol withdrawal, with high risk, requiring ICU admission.   Admission dispositi

## 2018-01-23 NOTE — PAYOR COMM NOTE
--------------  CONTINUED STAY REVIEW    Payor: [facility] PPO  Subscriber #:  [id number]  Authorization Number: [id number]    Admit date: 1/23/18  Admit time: 9969    Admitting Physician: Lis Paredes DO  Attending Physician:  Mega Avila MD 01/23/18 1200 -- 115 -- 147/91 -- --    01/23/18 1200 98.4 °F (36.9 °C) -- 20 -- -- --    01/23/18 1000 -- 107 20 129/91 94 % --    01/23/18 0815 98.4 °F (36.9 °C) 104 16  168/98 93 % --    01/23/18 0800 -- 104 22  168/98 -- --    01/23/18 0615 -

## 2018-01-23 NOTE — PAYOR COMM NOTE
--------------  ADMISSION REVIEW     Payor: [facility] PP  Subscriber #:  [id number]  Authorization Number: N/A    Admit date: 1/23/18  Admit time: 8811       Admitting Physician: Jaime Cueto DO  Attending Physician:  Dilma Elizabeth MD  Prim RAINBOW DRAW LAVENDER   RAINBOW DRAW LIGHT GREEN   RAINBOW DRAW BLUE   RAINBOW DRAW GOLD[MS.2]     EKG    Rate, intervals and axes as noted on EKG Report. Rate: 111  Rhythm: Sinus Rhythm  Reading:  Rate, intervals and axes as noted on EKG Report.   No ac Route User    1/23/2018 0815 Given 100 mg Oral Lynn Austin RN      0.9%  NaCl infusion     Date Action Dose Route User    1/23/2018 0008 Restarted (none) Intravenous SaquimRadha lundy RN      0.9%  NaCl infusion     Date Action Dose Route User    1/23/2018 0

## 2018-01-24 NOTE — PROGRESS NOTES
[facility] H[facility]PITALIST  Progress Note     [facility]:  [facility]    3/3/1955 MRN [id number]   [facility] 3NE-A Attending Collin Moulton MD   Hosp Day # 1 PCP Vinny Pena MD     Reason for Admission: Alcohol detox disorder  [facility] detox- March 2017, April 2017, Aug 2017, Nov 2017  [facility]- Aug 2016, [facility]- May and June 2015   Had DUI 7-8 yrs ago. His drinking became heavy 9 yrs ago after his divorce. He had 2-3 yrs of sobriety over the past 9 yrs.      2) Ex-c last 72 hours. Imaging: Imaging data reviewed in Epic.     Medications:   • potassium chloride 40mEq IVPB (peripheral line)  40 mEq Intravenous Once   • AmLODIPine Besylate  10 mg Oral Daily   • Losartan Potassium  50 mg Oral Daily   • pregabalin  1

## 2018-01-24 NOTE — PROGRESS NOTES
Pharmacy Note: Route Optimization for Multivitamin, Folic acid and Thiamine         Loly Gaviria currently has orders for a daily infusion with Multivitamin, Folic acid and Thiamine IV.   The patient meets the criteria to convert to the oral equivalents as

## 2018-01-25 NOTE — PROGRESS NOTES
Received patient at 0730  A/O x 4  NSR on tele  Sating 96% on RA  CIWA 1 this morning  Complaining of anxiety, but no other withdrawal symptoms  Dr. Verónica Hightower assessing patient, and order placed for Atarax PRN for anxiety  Patient also had large loose stool thi

## 2018-01-25 NOTE — PLAN OF CARE
Pt is A/O x 3. Does not remember day. CIWA Q 2. Seiz precautions no seizure activity. MOE CPAP at night. Will need outpatient sleep study.   Tele NSR, ST  IVF  Electrolyte replacement per protocol  Will continue to monitor    CARDIOVASCULAR - ADULT    • Ab

## 2018-01-25 NOTE — PROGRESS NOTES
Pt had tearful episode where he voiced to the nurse that he was depressed and that the root of his drinking stemmed from depression. Ativan 1 mg PO given. Pt consoled. CIWA Q 2. Will continue to monitor.

## 2018-01-25 NOTE — PROGRESS NOTES
[facility] HOSPITALIST  Progress Note     Floyce Fearing Patient Status:  Inpatient    3/3/1955 MRN [id number]   [facility] 3NE-A Attending Addie Deras MD   Hosp Day # 2 PCP Robby Sanchez MD     Reason for Admission: Alcohol detox neuropathy, GERD, OA,     Past Psychiatric History:  1) Anxiety and depressive disorder NOS. Symptoms are usually associated with his alcohol use.  On Zoloft and Seroquel.      Substance Use History:  1) Severe alcohol use disorder  [facility] detox- March 2017, A ALT  94*   --    --   69*   --    BILT  0.8   --    --   1.2   --    TP  7.9   --    --   6.8   --     < > = values in this interval not displayed. Estimated Creatinine Clearance: 132.1 mL/min (based on SCr of 0.53 mg/dL (L)).     No results for in

## 2018-01-26 PROBLEM — E87.6 HYPOKALEMIA: Status: RESOLVED | Noted: 2017-03-05 | Resolved: 2018-01-26

## 2018-01-26 PROBLEM — F10.239 ALCOHOL DEPENDENCE WITH WITHDRAWAL WITH COMPLICATION (HCC): Status: RESOLVED | Noted: 2017-10-19 | Resolved: 2018-01-26

## 2018-01-26 PROBLEM — R94.31 PROLONGED Q-T INTERVAL ON ECG: Status: RESOLVED | Noted: 2018-01-23 | Resolved: 2018-01-26

## 2018-01-26 PROBLEM — R11.0 NAUSEA: Status: RESOLVED | Noted: 2018-01-23 | Resolved: 2018-01-26

## 2018-01-26 NOTE — PROGRESS NOTES
NURSING DISCHARGE NOTE    Discharged Home via Wheelchair. Accompanied by Support staff  Belongings Taken by patient/family. Reviewed discharge instructions with patient and provided patient with RX for Prozac and Atarax.   Patient verbalized underst

## 2018-01-26 NOTE — DISCHARGE SUMMARY
[facility] Patient Status:  Inpatient    3/3/1955 MRN [id number]   [facility] 3NE-A Attending Renata Borrego MD   Hosp Day # 3 PCP Beth Kohler MD     Date of Admission: 18  Date his prolonged QTc > 600ms, his zoloft was discontinued. He had tremors, muscle aches, sweating, and nausea, but did well with detox. His repeat EKG showed a QTc < 500ms.  He was started on Prozac 20mg daily to treat his anxiety/depression and because Prozac SEROQUEL        Sertraline HCl 100 MG Tabs  Commonly known as:  ZOLOFT              Where to Get Your Medications      Please  your prescriptions at the location directed by your doctor or nurse    Bring a paper prescription for each of these medica

## 2018-01-26 NOTE — PLAN OF CARE
Pt is A/O x 4. CIWA Q 2. Pt mostly complains of moderate anxiety - had ativan 1mg PO x 2 overnight. Seizure prec no seizure activity. Pt seems to be doing better, no tearful or acute anxiety episodes. Given medication education.   Electrolytes replaced per

## 2018-01-26 NOTE — PAYOR COMM NOTE
--------------  CONTINUED STAY REVIEW    Payor: [employer] [facility]INA  Subscriber #:  [id number]  Authorization Number: [id number]    Admit date: 1/23/18  Admit time: 3339  Discharge date:  1/26/18    Admitting Physician: DO Zakia Vazquez 1) Anxiety and depressive disorder NOS. Symptoms are usually associated with his alcohol use.  On Zoloft and Seroquel.      Substance Use History:  1) Severe alcohol use disorder  [facility] detox- March 2017, April 2017, Aug 2017, Nov 2017  [facility] detox- Aug 2016 • potassium chloride 40mEq IVPB (peripheral line)  40 mEq Intravenous Once   • AmLODIPine Besylate  10 mg Oral Daily   • Losartan Potassium  50 mg Oral Daily   • pregabalin  100 mg Oral BID   • docusate sodium  100 mg Oral BID   • multivitamin/thiamine/fol 10 point ROS completed and was negative, except for pertinent positive and negatives stated in subjective.     Vital signs:  Temp:  [97.4 °F (36.3 °C)-98.4 °F (36.9 °C)] 97.9 °F (36.6 °C)  Pulse:  [] 115  Resp:  [18-20] 18  BP: ()/() 115/ • docusate sodium  100 mg Oral BID                               Electronically signed by Jj Gonzalez MD at 1/25/2018 10:54 AM               MEDICATIONS ADMINISTERED IN LAST 1 DAY:  AmLODIPine Besylate (NORVASC) tab 10 mg     Date Action Dose Route User 01/25/18 1400 -- 111 -- 128/78 -- -- LR   01/25/18 1151 98.4 °F (36.9 °C) 104 18 138/91 -- --    01/25/18 1000 -- 115 -- 115/96 -- -- LR   01/25/18 0800 -- 118 -- 119/91 -- --    01/25/18 0800 98.2 °F (36.8 °C) -- 18 -- -- --    01/25/18 0600 -- 102 01/23/18 0515 -- 103 22  157/113 96 % -- DZ   01/23/18 0411 -- 111 22  150/121 97 % -- DZ   01/23/18 0245 -- 107 30  149/116 95 % -- DZ   01/23/18 0213 -- 115 20  160/113 97 % -- DZ   01/23/18 0041 -- 109 19  146/117 97 % -- LW   01/22/18 2309 98.8 °F (37.

## 2018-01-29 NOTE — PAYOR COMM NOTE
--------------  DISCHARGE REVIEW    Payor: [employer]STEL NINA  Subscriber #:  [id number]  Authorization Number: [id number]    Admit date: 1/23/18  Admit time:  0789  Discharge Date: 1/26/2018  4:08 PM     Admitting Physician: DO Kendrick Antonio Risk.    TCM Follow-Up Recommendation:[RH.1]  LACE 29-58:  Moderate Risk of readmission after discharge from the hospital. But given his severe alcohol use disorder, it is really HIGH.[RH.2]     Discharge Diagnosis:[RH.1]   Alcohol withdrawal syndrome with Prescription details   AmLODIPine Besylate 10 MG Tabs  Commonly known as:  NORVASC      Take 10 mg by mouth daily.    Refills:  0     BABY ASPIRIN 81 MG Chew  Generic drug:  aspirin      1 TABLET DAILY   Refills:  0     folic acid 1 MG Tabs  Commonly known

## 2018-03-21 PROBLEM — F10.929 ALCOHOL INTOXICATION, WITH UNSPECIFIED COMPLICATION (HCC): Status: RESOLVED | Noted: 2017-08-11 | Resolved: 2018-03-21

## 2018-03-21 PROBLEM — F10.920 ALCOHOLIC INTOXICATION WITHOUT COMPLICATION (HCC): Status: RESOLVED | Noted: 2017-10-19 | Resolved: 2018-03-21

## 2018-03-21 PROBLEM — IMO0002 ALCOHOL USE DISORDER: Status: RESOLVED | Noted: 2017-10-29 | Resolved: 2018-03-21

## 2018-03-21 PROBLEM — F10.20 ETOH DEPENDENCE (HCC): Status: RESOLVED | Noted: 2017-03-06 | Resolved: 2018-03-21

## 2018-03-21 NOTE — ED NOTES
Pt SPO2 90-97% RA while asleep. Called [facility], spoke to Jairon Nazario and update given. [facility] called for transport per PATSY Garcia.  Awaiting dispo

## 2018-03-21 NOTE — ED PROVIDER NOTES
Patient Seen in: [facility] Emergency Department    History   Patient presents with:  Alcohol Intoxication (neurologic)    Stated Complaint: arrived for detox request, arrived tearful    HPI    This is a 52-year-old male with past medical history of REPLACEMENT SURGERY  No date: SHOULDER SURG PROC UNLISTED      Comment: right shoulder-2012?         Smoking status: Former Smoker                                                              Packs/day: 2.00      Years: 20.00        Types: Cigarettes     Quinten Leal Urine, Qualitative Positive (*)     All other components within normal limits    Narrative:     Results of the Urine Drug Screen should be used only for medical purposes.    ETHYL ALCOHOL - Abnormal; Notable for the following:     Ethyl Alcohol 302 (*) this visit. Follow-up:  No follow-up provider specified.       Medications Prescribed:  Current Discharge Medication List

## 2018-03-21 NOTE — ED NOTES
Report called to Sadiq Lopez at [facility]. She was informed that pt is currently on O2 at 2L/min via NC, SPO2 drops to 88% RA when pt is sound asleep. Per Ashley Greet can't be on O2 here, we don't have that, they have to be self sufficient to be able to come here. \" D

## 2018-03-21 NOTE — ED INITIAL ASSESSMENT (HPI)
Pt arrived via EMS, alert, oriented x2. Pt tearful, states, \"I can't stop drinking. \" Admits ETOH, \"drinking since Thursday, I've been sober for 3-4 months, just a lot of negative things, hard to explain. \" Pt denies SI/HI, denies hallucinations, when as

## 2018-03-21 NOTE — BH LEVEL OF CARE ASSESSMENT
Level of Care Assessment Note    General Questions  Why are you here?: Pt is a 59 yr old male who arrived to the ER via ambulance seeking alcohol detox. Pt states he called for an ambulance to bring him to the ER.  Pt states \"I started drinking last Thursd Patient is Here Today: na  Family's Biggest Areas of Concern: na    Referral Source  Referral Source: Catrachita 3: [facility]  Person/Contact Name: [facility]    Suicide Risk  Current/Recent Suicidal Ideation: No  Current/Recent/Future Suicide P other than as a withdrawal sx  Trauma Reaction: Other (pt denied)  Bipolar Symptoms: No problems reported or observed  Sleep Pattern: Other (comment) (pt states when he wasn't drinking was sleeping 5hrs a night; states can't keep track of how many hours he detox. Pt was last admitted to [facility] for medical detox in Jan 2018 and was sober until last Thursday.   Last Use?: today, drank 8 beers  Is your current use the most/worst it has ever been? : Yes    Illicit and Prescription Drug Use  Which if any assessment)  Psychomotor Behavior  Gait/Movement: Other (comment) (derik; pt was laying on hospital bed)  Abnormal movements: None  Posture: Other (comment) (derik; pt was laying on hospital bed)  Rate of Movement: Normal  Mood and Affect  Mood or Feelings:  An at 9:22pm on 3/20/18. Pt reports a hx of visual hallucinations and chills as withdrawal sx. Pt states his current withdrawal sx is anxiety. Per ER RN, pt also reported a mild headache. Pt resides with his son. Pt states he works part-time.  Pt denies SI/HI/

## 2018-03-21 NOTE — ED PROVIDER NOTES
Patient has been seen by [facility] and will be admitted to [facility]. IV Ativan is given here. Patient will be admitted via ambulance to [facility].

## 2018-03-21 NOTE — ED NOTES
Pt now asleep, he rouses to verbal stimuli then falls back to sleep. SPO2 dropped to 88% RA while sleeping.  Pt placed on O2 at 3L/NC, SPO2 up to 94%

## 2018-03-21 NOTE — ED NOTES
Pt now sound asleep after Ativan IV given. He responds to verbal stimuli then falls back to sleep. Admits that he has not taken all his BP meds for 3 days.  informed of /100

## 2018-05-06 PROBLEM — F10.20 ALCOHOL DEPENDENCE (HCC): Status: ACTIVE | Noted: 2018-05-06

## 2018-05-06 NOTE — ED PROVIDER NOTES
Patient Seen in: [facility] Emergency Department    History   Patient presents with:  Eval-P (psychiatric)    Stated Complaint: Beryle Pierre    HPI    Patient was discharged in March of this year.   He was admitted for alcohol withdrawal.  Patient has a lo 56.4 oz/week     Cans of beer: 24, Shots of liquor: 70 per week     Comment: Reports to drink 5-6 beers and 1/5 Vodka               per day      Review of Systems    Positive for stated complaint: Eval P  Other systems are as noted in HPI.   Constitutional COMP METABOLIC PANEL (14) - Abnormal; Notable for the following:        Result Value    Glucose 181 (*)     Sodium 133 (*)     Chloride 99 (*)     All other components within normal limits   ETHYL ALCOHOL - Abnormal; Notable for the following:     Ethyl intoxication without complication ([facility] [address]  (primary encounter diagnosis)    Disposition:  Psychiatric transfer  5/6/2018  2:39 pm    Follow-up:  No follow-up provider specified.       Medications Prescribed:  Current Discharge Medication List

## 2018-05-06 NOTE — BH LEVEL OF CARE ASSESSMENT
Level of Care Assessment Note    General Questions  Why are you here?: detox  Precipitating Events: Per pt he has been drinkihng too much. Pt reports drinking for 1 week. Pt reports drinking 12pack, pt denied hard alcohol use and other drugs.  Pt continues denied  Past Suicide Risk Collateral Provided By[de-identified] pt   Family History or Personal Lived Experience of Loss or Near Loss by Suicide: Denies    Danger to Others/Property  Have you harmed someone or had thoughts about wanting someone harmed or killed in the Last Seen: 2017  Reason: etoh     Prior Psychiatrist  Name: psychiatrist- Carol Like recall name.    Dates of Treatment: 10 yrs ago  Date Last Seen: 10yrs ago  Reason: medications  Current/Previous MH/CD Treatment  Recovery Support Groups: 12 step groups (comme that makes you feel unsafe?: No  Have You Ever Been Harmed by a Partner/Caregiver?: No (per last assessment )  Health Concerns r/t Abuse: No  Possible Abuse Reportable to[de-identified] Not appropriate for reporting to authorities    General Appearance  Characteristics assessment pt did not want to answer many more questions and hx was obtained from previous assessments. Per ER staff pt denied SI/HI. P er ER staff, ems reported pt typically [facility] to [facility] but when they refuse to give him ativan he leaves AMA.   Writer c

## 2018-05-21 PROBLEM — N52.02 CORPORO-VENOUS OCCLUSIVE ERECTILE DYSFUNCTION: Status: ACTIVE | Noted: 2018-05-21

## 2018-05-28 NOTE — ED NOTES
Pt states he wants detox. Was here 2 weeks ago for same and was placed at [facility] for tx. States he doesn't drink a lot, only had a 12 pack today. Asks for something to calm him. IV initiated and labs drawn,  Urine collected.

## 2018-05-28 NOTE — ED PROVIDER NOTES
Patient Seen in: [facility] Emergency Department    History   Patient presents with:  Eval-P (psychiatric)    Stated Complaint: detox    HPI    Patient is a 61-year-old male, with long-standing history of alcohol abuse, presenting for evaluation aft °C) [05/28/18 1549]  Temp src: Temporal [05/28/18 1549]  SpO2: 95 % [05/28/18 1549]  O2 Device: None (Room air) [05/28/18 1930]    Current:BP (!) 153/109   Pulse 120   Temp 97.8 °F (36.6 °C) (Temporal)   Resp 20   Wt 86 kg   SpO2 94%   BMI 30.60 kg/m² Narrative: The following orders were created for panel order CBC WITH DIFFERENTIAL WITH PLATELET.   Procedure                               Abnormality         Status                     ---------                               -----------         ------

## 2018-05-28 NOTE — ED NOTES
Pt questioned about whether or not he wants detox,  Does not give an answer, asked what his options are.   Would like to go home by cab,  Explained to pt that was not possible

## 2018-05-29 NOTE — ED NOTES
Pt was questioned about his initial request for REHAB, pt is now declining any re-admission into rehab and is complaining of nasal congestion only. Nasal spray ordered from pharmacy. Dr. Brenda Bravo updated.

## 2018-05-29 NOTE — ED NOTES
Pt is verbally responding to staff now, reports feeling \"congested\" and feels he is having \"trouble breathing\". No retractions noted, no distress noted. Pt describes nasal discomfort and saline nasal drops discussed.  Room air saturations are 92%, pt de

## 2018-05-29 NOTE — ED NOTES
Nasal spray utilized and kleenex provided to pt. Pt continues to report \"no pain\", but states he just feels uncomfortable d/t congestion. Vitals are stable.

## 2018-07-24 LAB
AMPHETAMINES UR QL SCN>500 NG/ML: NEGATIVE
ANALYZER ANC (IANC): NORMAL
ANION GAP SERPL CALC-SCNC: 17 MMOL/L (ref 10–20)
BARBITURATES UR QL SCN>200 NG/ML: NEGATIVE
BASOPHILS # BLD: 0 THOUSAND/MCL (ref 0–0.3)
BASOPHILS NFR BLD: 0 %
BENZODIAZ UR QL SCN>200 NG/ML: NEGATIVE
BUN SERPL-MCNC: 4 MG/DL (ref 6–20)
BUN/CREAT SERPL: 6 (ref 7–25)
BZE UR QL SCN>150 NG/ML: NEGATIVE
CALCIUM SERPL-MCNC: 8.7 MG/DL (ref 8.4–10.2)
CANNABINOIDS UR QL SCN>50 NG/ML: NEGATIVE
CHLORIDE: 100 MMOL/L (ref 98–107)
CO2 SERPL-SCNC: 24 MMOL/L (ref 21–32)
CREAT SERPL-MCNC: 0.72 MG/DL (ref 0.67–1.17)
DIFFERENTIAL METHOD BLD: NORMAL
EOSINOPHIL # BLD: 0.1 THOUSAND/MCL (ref 0.1–0.5)
EOSINOPHIL NFR BLD: 1 %
ERYTHROCYTE [DISTWIDTH] IN BLOOD: 13.1 % (ref 11–15)
ETHANOL SERPL-MCNC: 296 MG/DL
GLUCOSE SERPL-MCNC: 137 MG/DL (ref 65–99)
HEMATOCRIT: 45.8 % (ref 39–51)
HGB BLD-MCNC: 16.6 GM/DL (ref 13–17)
LYMPHOCYTES # BLD: 2.5 THOUSAND/MCL (ref 1–4)
LYMPHOCYTES NFR BLD: 39 %
MAGNESIUM SERPL-MCNC: 2.1 MG/DL (ref 1.7–2.4)
MCH RBC QN AUTO: 32.7 PG (ref 26–34)
MCHC RBC AUTO-ENTMCNC: 36.2 GM/DL (ref 32–36.5)
MCV RBC AUTO: 90.2 FL (ref 78–100)
MONOCYTES # BLD: 0.6 THOUSAND/MCL (ref 0.3–0.9)
MONOCYTES NFR BLD: 9 %
NEUTROPHILS # BLD: 3.3 THOUSAND/MCL (ref 1.8–7.7)
NEUTROPHILS NFR BLD: 51 %
NEUTS SEG NFR BLD: NORMAL %
NRBC (NRBCRE): NORMAL
OPIATES UR QL SCN>300 NG/ML: NEGATIVE
PCP UR QL SCN>25 NG/ML: NEGATIVE
PLATELET # BLD: 281 THOUSAND/MCL (ref 140–450)
POTASSIUM SERPL-SCNC: 4 MMOL/L (ref 3.4–5.1)
RBC # BLD: 5.08 MILLION/MCL (ref 4.5–5.9)
SODIUM SERPL-SCNC: 137 MMOL/L (ref 135–145)
WBC # BLD: 6.5 THOUSAND/MCL (ref 4.2–11)

## 2018-07-25 ENCOUNTER — HOSPITAL (OUTPATIENT)
Dept: OTHER | Age: 63
End: 2018-07-25
Attending: INTERNAL MEDICINE

## 2018-07-25 ENCOUNTER — IMAGING SERVICES (OUTPATIENT)
Dept: OTHER | Age: 63
End: 2018-07-25

## 2018-07-25 LAB
ANION GAP SERPL CALC-SCNC: 18 MMOL/L (ref 10–20)
BUN SERPL-MCNC: 7 MG/DL (ref 6–20)
BUN/CREAT SERPL: 9 (ref 7–25)
CALCIUM SERPL-MCNC: 8.3 MG/DL (ref 8.4–10.2)
CHLORIDE: 102 MMOL/L (ref 98–107)
CO2 SERPL-SCNC: 23 MMOL/L (ref 21–32)
CREAT SERPL-MCNC: 0.74 MG/DL (ref 0.67–1.17)
GLUCOSE SERPL-MCNC: 117 MG/DL (ref 65–99)
MAGNESIUM SERPL-MCNC: 1.8 MG/DL (ref 1.7–2.4)
PHOSPHATE SERPL-MCNC: 3.1 MG/DL (ref 2.4–4.7)
POTASSIUM SERPL-SCNC: 4 MMOL/L (ref 3.4–5.1)
SODIUM SERPL-SCNC: 139 MMOL/L (ref 135–145)

## 2018-07-26 LAB
ANION GAP SERPL CALC-SCNC: 9 MMOL/L (ref 10–20)
BUN SERPL-MCNC: 17 MG/DL (ref 6–20)
BUN/CREAT SERPL: 23 (ref 7–25)
CALCIUM SERPL-MCNC: 8 MG/DL (ref 8.4–10.2)
CHLORIDE: 104 MMOL/L (ref 98–107)
CO2 SERPL-SCNC: 29 MMOL/L (ref 21–32)
CREAT SERPL-MCNC: 0.75 MG/DL (ref 0.67–1.17)
GLUCOSE SERPL-MCNC: 127 MG/DL (ref 65–99)
MAGNESIUM SERPL-MCNC: 2.2 MG/DL (ref 1.7–2.4)
POTASSIUM SERPL-SCNC: 3.9 MMOL/L (ref 3.4–5.1)
SODIUM SERPL-SCNC: 138 MMOL/L (ref 135–145)

## 2018-08-04 ENCOUNTER — APPOINTMENT (OUTPATIENT)
Dept: GENERAL RADIOLOGY | Facility: HOSPITAL | Age: 63
DRG: 896 | End: 2018-08-04
Attending: EMERGENCY MEDICINE
Payer: COMMERCIAL

## 2018-08-04 ENCOUNTER — APPOINTMENT (OUTPATIENT)
Dept: CT IMAGING | Facility: HOSPITAL | Age: 63
DRG: 896 | End: 2018-08-04
Attending: EMERGENCY MEDICINE
Payer: COMMERCIAL

## 2018-08-04 PROBLEM — F10.921 ALCOHOL INTOXICATION WITH DELIRIUM (HCC): Status: ACTIVE | Noted: 2018-08-04

## 2018-08-04 PROCEDURE — 71275 CT ANGIOGRAPHY CHEST: CPT | Performed by: EMERGENCY MEDICINE

## 2018-08-04 PROCEDURE — 71045 X-RAY EXAM CHEST 1 VIEW: CPT | Performed by: EMERGENCY MEDICINE

## 2018-08-05 ENCOUNTER — APPOINTMENT (OUTPATIENT)
Dept: CV DIAGNOSTICS | Facility: HOSPITAL | Age: 63
DRG: 896 | End: 2018-08-05
Attending: HOSPITALIST
Payer: COMMERCIAL

## 2018-08-05 ENCOUNTER — APPOINTMENT (OUTPATIENT)
Dept: GENERAL RADIOLOGY | Facility: HOSPITAL | Age: 63
DRG: 896 | End: 2018-08-05
Attending: EMERGENCY MEDICINE
Payer: COMMERCIAL

## 2018-08-05 ENCOUNTER — APPOINTMENT (OUTPATIENT)
Dept: ULTRASOUND IMAGING | Facility: HOSPITAL | Age: 63
DRG: 896 | End: 2018-08-05
Attending: HOSPITALIST
Payer: COMMERCIAL

## 2018-08-05 PROCEDURE — 93306 TTE W/DOPPLER COMPLETE: CPT | Performed by: HOSPITALIST

## 2018-08-05 PROCEDURE — 71045 X-RAY EXAM CHEST 1 VIEW: CPT | Performed by: NURSE PRACTITIONER

## 2018-08-05 PROCEDURE — 93970 EXTREMITY STUDY: CPT | Performed by: HOSPITALIST

## 2018-08-05 NOTE — BH PROGRESS NOTE
Went to see the pt and he said he is not interested in any cd program.  He states he was just in pt at [facility] in May 2018. The pt states he has already done 3x of outpt cd,  He reports he drinks 1 case of beer per day and 1 bottle of whiskey.   The pt states

## 2018-08-05 NOTE — PLAN OF CARE
Pt. Has been drowsy and slept most of day per his request.  CIWA 3-8. Prominent sysmptoms are anxiety, seats and slight hand tremors. Refused [facility] assistance today. Appetite fair. Taking fluids well. Wants to go home.  He is not completely sure what hap

## 2018-08-05 NOTE — ED PROVIDER NOTES
Patient Seen in: [facility] Emergency Department    History   Patient presents with:  Alcohol Intoxication (neurologic)    Stated Complaint: etoh    HPI    61-year-old white male who presents emergency room today for complaint of alcohol intoxicatio HPI.  Constitutional and vital signs reviewed. All other systems reviewed and negative except as noted above.     Physical Exam   ED Triage Vitals [08/04/18 1911]  BP: (!) 154/96  Pulse: (!) 126  Resp: 18  Temp: 100.3 °F (37.9 °C)  Temp src: Temporal panel order CBC WITH DIFFERENTIAL WITH PLATELET.   Procedure                               Abnormality         Status                     ---------                               -----------         ------                     CBC W/ DIFFERENTIAL[[id number]] further workup and evaluation. I spoke to the [facility] he agrees to admit the patient primarily and he will be admitted to the ICU. I spoke with the [facility] pulmonologist who will follow the patient as well.           Disposition and Plan     Clini

## 2018-08-05 NOTE — ED INITIAL ASSESSMENT (HPI)
Pt brought in via medics for ETOH. Pt admits to drinking 1 bottle of vodka this am and per medics had several beers also. Pt states feeling \"weird and anxious. \" pt denies n,v,d. Pt states he wants help to stop drinking.  Pt denies si/hi

## 2018-08-05 NOTE — ED PROVIDER NOTES
Patient is a 59-year-old male who was initially evaluated by my partner, please refer to their documentation for additional details. Patient's case was signed over to me while pending admission and CT angiogram results at change of shift.   CT a showed P

## 2018-08-05 NOTE — CONSULTS
[facility] Pulmonary, Critical Care and Sleep    Asia Ramirez Patient Status:  Inpatient    3/3/1955 MRN [id number]   Location 458/458-A PCP Pedro Gan MD     Date of Admission: 2018  History of Present Illness: Pt is a 59year old male consu FLUoxetine HCl 20 MG Oral Cap Take 1 capsule (20 mg total) by mouth daily. Disp: 30 capsule Rfl: 1   pregabalin 100 MG Oral Cap Take 150 mg by mouth 2 (two) times daily. Disp:  Rfl:    AmLODIPine Besylate 10 MG Oral Tab Take 10 mg by mouth daily.  Disp: Glucose 138 (H) 70 - 99 mg/dL   BUN 10 8 - 20 mg/dL   Creatinine 0.85 0.70 - 1.30 mg/dL   GFR, Non-African American 93 >=60   GFR, -American 108 >=60   Calcium, Total 7.5 (L) 8.3 - 10.3 mg/dL   Alkaline Phosphatase 106 45 - 117 U/L   AST 60 (H) 15 - Atrial rate 119 BPM   P-R Interval 166 ms   QRS Duration 74 ms   Q-T Interval 326 ms   QTC Calculation (Bezet) 458 ms   P Axis 27 degrees   R Axis -3 degrees   T Axis 36 degrees   -MAGNESIUM   Collection Time: 08/05/18  6:07 AM   Result Value Ref Range   M WBC 4.5 4.0 - 13.0 x10(3) uL   RBC 3.49 (L) 4.30 - 5.70 x10(6)uL   HGB 11.1 (L) 13.0 - 17.0 g/dL   HCT 31.8 (L) 37.0 - 53.0 %   .0 (L) 150.0 - 450.0 10(3)uL   MCV 91.1 80.0 - 99.0 fL   MCH 31.8 27.0 - 33.2 pg   MCHC 34.9 31.0 - 37.0 g/dL   RDW 13.2 Treat at least for 6 months, but could consider extended therapy pending pt's response to anticoagluation. 3. H/o Tobacco abuse. Cessation recommended. 4. Proph  Anticoagulated. 5. Dispo  ICU monitoring today for etoh withdrawal.   Full code.      C

## 2018-08-05 NOTE — H&P
[facility]  History and Physical     Cecily Miranda Patient Status:  Emergency    3/3/1955 MRN [id number]   Location [address] Attending Annie Salas MD   Hosp Day # 0 PCP Santi Burrell MD     Chief Com Outpatient Prescriptions on File Prior to Encounter:  multivitamin Oral Tab Take 1 tablet by mouth daily. Disp:  Rfl: 0   Metoprolol Succinate ER 25 MG Oral Tablet 24 Hr Take 1 tablet (25 mg total) by mouth Daily Beta Blocker.  Disp: 30 tablet Rfl: 0   FLUo mg/dL). No results for input(s): PTP, INR in the last 168 hours. No results for input(s): TROP, CK in the last 168 hours. Imaging: Imaging data reviewed in Epic. ASSESSMENT / PLAN:     1. Acute RLL PE   1. Heparin   2.  Discuss Ac with him whe

## 2018-08-05 NOTE — PROGRESS NOTES
ICU  Critical Care APRN Progress Note    NAME: Ubaldo Sutherland - ROOM: 365/[facility]-A - MRN: [id number] - Age: 59year old - :3/3/1955    History Of Present Illness:  Ubaldo Sutherland is a 59year old male with PMHx significant for alcohol abuse, seizure--2/2 alcoho • [facility] ([facility]    • High blood pressure    • Hyperlipidemia    • Hypertension    • Myocardial infarction [facility])    • Osteoarthritis    • Seizure ([facility]     secondary to etoh w/d   • Sleep apnea    • Visual impairment      Social Hx:  Smoking status: admission:  Ct Angiography, Chest (cpt=71275)    Result Date: 8/4/2018  CONCLUSION:   1. Acute pulmonary emboli in the right lower lobe with an overall mild embolic burden. 2.  Scattered atelectasis/scarring in the bilateral lungs.   3.  Bronchial wall th HTN  -Resume metoprolol  -Patient states no longer taking amlodipine--will dc and monitor with single agent    5. Anxiety/Depression  -Continue home medications    6.   Sleep Apnea  -MOE protocol  -CPAP at night and for naps if indicated  -Unclear when las

## 2018-08-05 NOTE — PROGRESS NOTES
[facility]  Progress Note     Jeremy Manrique Patient Status:  Inpatient    3/3/1955 MRN [id number]   [facility] 4SW-A Attending Reynaldo Dunne MD   Hosp Day # 0 PCP Diana Kaba MD     Chief Complaint: ETOH intox    S: Succinate ER  25 mg Oral Daily Beta Blocker   • aspirin  81 mg Oral Daily   • [START ON 8/6/2018] Thiamine HCl  100 mg Oral Daily   • multivitamin with minerals  1 tablet Oral Daily   • folic acid  1 mg Oral Daily   • docusate sodium  100 mg Oral BID   • p

## 2018-08-06 NOTE — PROGRESS NOTES
[facility] dosing service    Initial Pharmacokinetic Consult for Vancomycin Dosing     Bakari Morales is a 59year old male admitted on 8/5/18 who is being treated for bacteremia. Pharmacy has been asked to dose Vancomycin by Dr. Ventura Church.     He has No [facility] Radiology:    Chest Xray - Minimal right basilar atelectasis/infiltrates, improved since prior examination. Based on the above:    1.  This patient will receive a loading dose of Vancomycin  2 gm IVPB (25mg/kg, capped at 2g) x 1 dose, followed by 1

## 2018-08-06 NOTE — PLAN OF CARE
Problem: PAIN - ADULT  Goal: Verbalizes/displays adequate comfort level or patient's stated pain goal  INTERVENTIONS:  - Encourage pt to monitor pain and request assistance  - Assess pain using appropriate pain scale  - Administer analgesics based on type plan of care. Made comfortable. Will continue to monitor closely.

## 2018-08-06 NOTE — PROGRESS NOTES
[facility]  Progress Note     Becky Padilla Patient Status:  Inpatient    3/3/1955 MRN [id number]   [facility] 4SW-A Attending Di Ramirez MD   Hosp Day # 1 PCP Ash Haddad MD     Chief Complaint: ETOH intox    S: --    TP  7.5  6.7   --    --    --        Estimated Creatinine Clearance: 128 mL/min (A) (based on SCr of 0.54 mg/dL (L)). Recent Labs   Lab  08/05/18   0608   PTP  14.5   INR  1.09       No results for input(s): TROP, CK in the last 168 hours.

## 2018-08-06 NOTE — PAYOR COMM NOTE
--------------  ADMISSION REVIEW     Payor: KRISTEL PP  Subscriber #:  [id number]  Authorization Number: N/A    Admit date: 8/5/18  Admit time: [address]       Admitting Physician: Kalyan Chavez MD  Attending Physician:  Dyan Short MD  Primary Care y No date: KNEE REPLACEMENT SURGERY  No date: SHOULDER SURG PROC UNLISTED      Comment: right shoulder-2012?         Smoking status: Former Smoker                                                              Packs/day: 2.00      Years: 20.00        Types: Cig Result Value    Glucose 138 (*)     Calcium, Total 7.5 (*)     AST 60 (*)     Albumin 3.3 (*)     Globulin  4.2 (*)     A/G Ratio 0.8 (*)     Sodium 132 (*)     Chloride 94 (*)     CO2 20.0 (*)     All other components within normal limits   ETHYL ALCO Patient had an IV established here in the emergency room was given IV fluids and had laboratory studies sent. Initial laboratory workup was generally unremarkable except for significant elevated blood alcohol level of 409.   Patient remained agitated durin Patient's case was signed over to me while pending admission and CT angiogram results at change of shift. CT a showed PE therefore heparin was ordered. Admitting physician updated of results and care turned over to him for further management.     Signed b Current Outpatient Prescriptions on File Prior to Encounter:  multivitamin Oral Tab Take 1 tablet by mouth daily. Disp:  Rfl: 0   Metoprolol Succinate ER 25 MG Oral Tablet 24 Hr Take 1 tablet (25 mg total) by mouth Daily Beta Blocker.  Disp: 30 tablet Rfl: Estimated Creatinine Clearance: 81.3 mL/min (based on SCr of 0.85 mg/dL). No results for input(s): PTP, INR in the last 168 hours. No results for input(s): TROP, CK in the last 168 hours. Imaging: Imaging data reviewed in Epic.       ASSESSMENT / P 8/6/2018 0817 Given 20 mg Oral Dannie Councilman, RN    8/5/2018 9169 Given 20 mg Oral Dannie Councilman, RN      folic acid (FOLVITE) tab 1 mg     Date Action Dose Route User    8/6/2018 5754 Given 1 mg Oral Dannie Councilman, RN    8/5/2018 2951 Given 1 mg Oral 8/6/2018 0448 New Bag (none) Intravenous Nicki Garcia RN    8/5/2018 2006 New Bag (none) Intravenous Nicki Garcia RN    8/5/2018 1600 Rate/Dose Verify (none) Intravenous Vikas Garland RN    8/5/2018 1211 New Bag (none) Intravenous Massimo Rothman 08/06/18 0353 -- 57 22 -- 97 % -- -- --    08/06/18 0300 -- 54 22 129/74 95 % -- -- --    08/06/18 0200 -- 62 21 123/70 95 % -- -- --    08/06/18 0100 -- 68 24 136/76 96 % -- -- --    08/06/18 0000 98.6 °F (37 °C) 71 20 144/82 99 % -- Other (Commen

## 2018-08-06 NOTE — CONSULTS
[address] [facility] Patient Status:  Inpatient    3/3/1955 MRN [id number]   [facility] 4SW-A Attending Kristeen Hashimoto, MD   Hosp Day # 1 PCP Yossi Mayes Facility-Administered Medications:   •  CefTRIAXone Sodium (ROCEPHIN) 2 g in sodium chloride 0.9 % 100 mL MBP/ADD-vantage, 2 g, Intravenous, Q24H  •  Vancomycin HCl (VANCOCIN) 2,000 mg in sodium chloride 0.9 % 500 mL IVPB, 25 mg/kg (Adjusted), Intravenous, Systems:    A comprehensive 10 point review of systems was completed. Pertinent positives and negatives noted in the the HPI. Physical Exam:    General: No acute distress. Alert and oriented x 3.   Vital signs: Temp:  [97.5 °F (36.4 °C)-100 °F (37.8 ° Culture FREQ X 2 [[id number]] Collected: 08/05/18 5774   Order Status: Completed Lab Status: Preliminary result Updated: 08/06/18 0700   Specimen: Blood from Blood,peripheral     Blood Culture Result No Growth 1 Day   Blood Culture FREQ X 2 [[id number]] (Abn (VSE), Enterococcus (VRE), Candida albicans.  Candida glabrata, Candida krusei, Candida parapsilosis, Candida tropicalis, L. monocytogenes, H. influenza, N. meningitidis, A. baumannii,   P. aeruginosa, Enterobacteriaceae sp., E. coli, E. cloacae complex, K. Tonie Mcclellan  [phone]

## 2018-08-06 NOTE — PROGRESS NOTES
[address] [facility] Patient Status:  Inpatient    3/3/1955 MRN [id number]   [facility] 4SW-A Attending Darren Daniel MD   Hosp Day # 1 PCP Eloina Fraser MD     Pulm / Critical Care Progress Note     S: Pt states tenderness or deformity. Heart: Regular rate and rhythm, normal S1S2, no murmur. Abdomen: soft, non-tender, non-distended, positive BS.    Extremity: RLE edema       Recent Labs   Lab  08/05/18   0608  08/05/18   0815  08/06/18   0456   WBC  4.3  4.5  3

## 2018-08-07 NOTE — PROGRESS NOTES
Informed of low PTT and on heparin gtt. Patient disconnected his heparin gtt. Informed RN to continue current dose and check in 6 hours, watch IV site and patient's adherence.           LUCIANO Heredia  Critical Care Nurse Practitioner  Spectra # [phone]

## 2018-08-07 NOTE — PROGRESS NOTES
[facility]  Progress Note     Porter Nicole Patient Status:  Inpatient    3/3/1955 MRN [id number]   [facility] 4SW-A Attending Earl Ralph MD   [address] Day # 2 PCP Elisa Miller MD     Chief Complaint: ETOH intox    S: --    --    --    AST  60*  45*   --    --    --    --    ALT  47  39   --    --    --    --    BILT  0.6  0.8   --    --    --    --    TP  7.5  6.7   --    --    --    --     < > = values in this interval not displayed.        Estimated Creatinine Cleara

## 2018-08-07 NOTE — BH PROGRESS NOTE
Went to see the pt because yesterday he told his nurse he is interested in cd treatment. Today talked with him about cd treatment. He was talking about mar[facility].   Informed him he was going through withdrawals here and is medicated according to his ciwa s

## 2018-08-07 NOTE — PAYOR COMM NOTE
--------------  CONTINUED STAY REVIEW    Payor: KRISTEL [facility]  Subscriber #:  [id number]  Authorization Number: [id number]    Admit date: 8/5/18  Admit time: [address]    Admitting Physician: Ra Mejia MD  Attending Physician:  Deisy Naranjo MD  Primary Thiamine HCl tab 100 mg, 100 mg, Oral, Daily  •  multivitamin with minerals (ADULT) tab 1 tablet, 1 tablet, Oral, Daily  •  folic acid (FOLVITE) tab 1 mg, 1 mg, Oral, Daily  •  0.9%  NaCl infusion, , Intravenous, Continuous  •  acetaminophen (TYLENOL) tab would treat for only 3 months and recheck ddimer at that time to assess need for further anticoagulation  - if pt bleeds or goes back to drinking, then IVC filter and stop anticoagulation  2.  MOE- needs to have PSG as OP and then obtain CPAP  -can be arran sodium chloride 0.9 % 100 mL MBP/ADD-vantage     Date Action Dose Route User    8/7/2018 0619 New Bag 2 g Intravenous Kenney Glover, RN      ChlordiazePOXIDE HCl (LIBRIUM) cap 10 mg     Date Action Dose Route User    8/7/2018 0845 Given 10 mg Oral Romeo Gautam, 7419 Given 25 mg Oral Tonie Pierre RN      Potassium Chloride ER (K-DUR M20) CR tab 40 mEq     Date Action Dose Route User    8/7/2018 1349 Given 40 mEq Oral Trish King RN    8/7/2018 1126 Given 40 mEq Oral Trish King RN      pregabalin (Firman Cull

## 2018-08-07 NOTE — PLAN OF CARE
Pt. Changes between auto pap and NC. When he sleeps does snore and at times drops his sats to the 70's. Was to transfer to 3rd floor but due to CIWA score, he did not fit their protocol. K replaced per protocol.   Diet advanced to general and tolerating

## 2018-08-07 NOTE — PROGRESS NOTES
[facility]                INFECTIOUS DISEASE PROGRESS NOTE    Rafi Scanlon Patient Status:  Inpatient    3/3/1955 MRN [id number]   [facility] 3NE-A Attending Fany Jean MD   Hosp Day # 2 PCP Briana Gil MD < > = values in this interval not displayed.        No results found for: [facility]  Microbiology  Blood Culture FREQ X 2 [[id number]] Collected: 08/05/18 6169   Order Status: Completed Lab Status: Preliminary result Updated: 08/07/18 0700   Specimen: Blood from reflux disease)     Tachycardia     [facility] trem[facility] ([facility]     Sleep apnea     Hyperlipidemia     Coronary artery disease involving native heart with angina pectoris ([facility]     Hyponatremia     Hyperglycemia     Depression     Obesity     Alcohol use disord

## 2018-08-07 NOTE — CM/SW NOTE
08/07/18 0900   CM/SW Referral Data   Referral Source Physician;Social Work (self-referral)   Reason for Referral Discharge planning   Informant Patient   Patient Info   Patient's Mental Status Alert;Oriented       MSW met with patient to discuss post d

## 2018-08-07 NOTE — PROGRESS NOTES
400 [facility] Patient Status:  Inpatient    3/3/1955 MRN [id number]   [facility] 3NE-A Attending Rosendo Valdez MD   Hosp Day # 2 PCP Nuria Mayer MD     SUBJECTIVE: no new events.   Still requiring ativan b tablet, Oral, Daily  •  folic acid (FOLVITE) tab 1 mg, 1 mg, Oral, Daily  •  0.9%  NaCl infusion, , Intravenous, Continuous  •  acetaminophen (TYLENOL) tab 650 mg, 650 mg, Oral, Q6H PRN  •  docusate sodium (COLACE) cap 100 mg, 100 mg, Oral, BID  •  magnesi anticoagulation  - if pt bleeds or goes back to drinking, then IVC filter and stop anticoagulation  2. MOE- needs to have PSG as OP and then obtain CPAP  -can be arranged at time of d/c home  3. Smoking- encouraged cessation  4.  ID- bacteremia- likely cont

## 2018-08-08 NOTE — DISCHARGE SUMMARY
[facility]  DISCHARGE SUMMARY     Miguel Pallas Patient Status:  Inpatient    3/3/1955 MRN [id number]   [facility] 3NE-A Attending Robert Iqbal MD   Hosp Day # 3 PCP Jasiel Garcia MD     Date of Admission: 2018 Tabs  Commonly known as:  XANAX      Take 1 tablet (0.25 mg total) by mouth every 6 (six) hours as needed for Anxiety.    Quantity:  20 tablet  Refills:  0     apixaban 5 MG Tabs  Commonly known as:  ELIQUIS      2 pills bid x 6 days, then 1 pill bid   Vernal Backers directed by your doctor or nurse    Bring a paper prescription for each of these medications  · ALPRAZolam 0.25 MG Tabs  · ChlordiazePOXIDE HCl 5 MG Caps         [place]    Follow-up appointment:   MD Meaghan [address] [phone]

## 2018-08-08 NOTE — PROGRESS NOTES
[facility]  Progress Note     Stacy Ortiz Patient Status:  Inpatient    3/3/1955 MRN [id number]   [facility] 4SW-A Attending Chas Menjivar MD   Hosp Day # 3 PCP Buck Mckenna MD     Chief Complaint: ETOH intox    S: 26.0  28.0   --   25.0   ALKPHO  106  90   --    --    --    --    --    AST  60*  45*   --    --    --    --    --    ALT  47  39   --    --    --    --    --    BILT  0.6  0.8   --    --    --    --    --    TP  7.5  6.7   --    --    --    --    --

## 2018-08-08 NOTE — PLAN OF CARE
NURSING DISCHARGE NOTE    Discharged Home via Ambulatory. Accompanied by Family member  Belongings Taken by patient/family. IV removed, discharge instructions given to patient, all pertinent questions and concerns addressed.

## 2018-08-08 NOTE — PROGRESS NOTES
400 [facility] Patient Status:  Inpatient    3/3/1955 MRN [id number]   [facility] 3NE-A Attending Lynn Clark MD   Hosp Day # 3 PCP Rusty Sequeira MD     Pulm / Critical Care Progress Note     S: Pt states obvious abnormality, atraumatic. Lungs: ctab   Chest wall: No tenderness or deformity. Heart: Regular rate and rhythm, normal S1S2, no murmur. Abdomen: soft, non-tender, non-distended, positive BS.    Extremity: no edema         Recent Labs   Lab  08/

## 2018-08-11 NOTE — ED INITIAL ASSESSMENT (HPI)
Patient states \"I need some help\" states \"I need someone to give me something for anxiety. \" Patient states he does drink, unsure when his last drink was. He states he drinks about a handle a day.  He states \"I feel like somethings got me down\" Patient

## 2018-08-11 NOTE — ED PROVIDER NOTES
Patient Seen in: [facility] Emergency Department    History   Patient presents with:  Alcohol Intoxication (neurologic)    Stated Complaint: Patient states \"I need some help\" states \"I need someone to give me something f*    HPI    Patient is a 7 \"I need some help\" states \"I need someone to give me something f*  Other systems are as noted in HPI. Constitutional and vital signs reviewed. All other systems reviewed and negative except as noted above.     Physical Exam   ED Triage Vitals [08/1 PTT, ACTIVATED - Abnormal; Notable for the following:     PTT 25.2 (*)     All other components within normal limits   CBC W/ DIFFERENTIAL - Abnormal; Notable for the following:     RDW-SD 47.1 (*)     All other components within normal limits   PROTHROM

## 2018-08-12 PROBLEM — F10.232 ALCOHOL WITHDRAWAL SYNDROME WITH PERCEPTUAL DISTURBANCE (HCC): Status: ACTIVE | Noted: 2018-08-12

## 2018-08-12 PROBLEM — F10.920 ALCOHOLIC INTOXICATION WITHOUT COMPLICATION (HCC): Status: ACTIVE | Noted: 2018-08-12

## 2018-08-12 NOTE — ED NOTES
Attempt to call report unsuccessful. Will attempt again in few minutes. Pt resting quietly, no distress noted, no complaints voiced, no seizure or withdrawal symptoms at this time.

## 2018-08-12 NOTE — ED NOTES
Patient now awake, becoming more agitated and anxious. The plan of care was again explained to the patient. Patient refused food tray. CIWA reassessed and patient medicated per orders.  Report given to Shereen Michelle RN

## 2018-08-12 NOTE — ED NOTES
Patient awake and alert, appearing more agitated and anxious. Plan of care explained to the patient and he appears to understand. Plan of care to continue to observe and monitor patient due to his history of alcohol abuse discussed with Dr Ashlee Zheng.

## 2018-08-12 NOTE — ED NOTES
ABHIJEET assessment is completed. Pt to be admitted for observation d/t CIWA scores and high risk of withdrawal/seizure symptoms.

## 2018-08-12 NOTE — PROGRESS NOTES
NURSING ADMISSION NOTE      Patient admitted via Cart  Oriented to room. Safety precautions initiated. Bed in low position. Call light in reach. Pt is aox4, but can be forgetful and slurring some words. Asking for ativan.   Admission documentation

## 2018-08-12 NOTE — H&P
[facility]  History and Physical     Asia Ramirez Patient Status:  Emergency    3/3/1955 MRN [id number]   Location [address] [facility] Attending Leita Bamberger, MD   Hosp Day # 0 PCP Pedro Gan MD     Chief Complain Problem Relation Age of Onset   • Hypertension Father    • Lipids Father    • Heart Disorder Father    • Hypertension Mother        Allergies: No Known Allergies    Medications:    No current facility-administered medications on file prior to encounter. and rhythm. No murmurs, rubs or gallops. Equal pulses. Chest and Back: No tenderness or deformity. Abdomen: Soft, nontender, nondistended. Positive bowel sounds. No rebound, guarding or organomegaly. Neurologic: No focal neurological deficits.  CNII-XI withdrawal  2. Depression  3. gerd  4. htn  5. hld  6. Hx of MI  7. Recent PE  1. CIWA protocol  2. Cont home meds  3. To go to [facility] when completes withdrawal  4. Hasn't taken his apixaban in days.  Will restart at full dose    Quality:  · DVT Prophy

## 2018-08-12 NOTE — BH LEVEL OF CARE ASSESSMENT
Level of Care Assessment Note    General Questions  Why are you here?: Pt states \"I need help. I cant stop drinking\" Pt states he has passive SI but denies any plan or intent to hamr himself.  Pt denies any HI   Precipitating Events: Pt states he called t actually had any thoughts of killing yourself? (past 30 days): No  6.  Have you ever done anything, started to do anything, or prepared to do anything to end your life? (lifetime): No  Score -  OV: 1- Low Risk   Describe : Pt states he has passive SI for asleep; Disturbed/interrupted sleep  Number of Sleep Hours: 7 Hours  Use of Sleep Aids: none reported   Appetite Symptoms: Normal for patient  Unplanned Weight Loss: No  Unplanned Weight Gain: No  History of Eating Disorder: No  Active Eating Disorder: No have any prior/current legal concerns?: DUI (Pt got a DUI last month- Pt's license is revoked )  History of Gang Involvement: No  Type of Residence: Private residence (Lives with his son )    Abuse Assessment  Physical Abuse: Denies  Verbal Abuse: Denies

## 2018-08-12 NOTE — ED NOTES
[facility] Care of pt assumed at this time.

## 2018-08-12 NOTE — ED NOTES
Hospitalist is here to see pt. Pt has been sleeping quietly since last dose Ativan. CIWA score is 3, awakens to verbal stimuli and pt answers questions asked appropriately . No seizure or withdrawal noted at this time.

## 2018-08-12 NOTE — PLAN OF CARE
DRUG ABUSE/DETOX    • Will have no detox symptoms and will verbalize plan for changing drug-related behavior Not Progressing        Patient is A&Ox3, disoriented to time.   Anxious while awake, calling nursing staff frequently asking for \"a shot of ativan

## 2018-08-13 NOTE — CM/SW NOTE
08/13/18 0900   Discharge disposition   Expected discharge disposition Home or Self   Name of [address] [facility]   Discharge transportation [place]  ([address] provided)

## 2018-08-13 NOTE — PLAN OF CARE
NURSING DISCHARGE NOTE    Discharged home via wheelchair. Accompanied by support staff. Belongings sent home with pt. Discharge instructions, f/u appointments discussed with pt. Pt provided with meds x2 from [facility] outpatient pharmacy.  Pt instruct

## 2018-08-13 NOTE — PAYOR COMM NOTE
--------------  ADMISSION REVIEW     Payor: [facility]  Subscriber #:  [id number]  Authorization Number: [id number]    Admit date: 8/12/18  Admit time: [place] [address]       Admitting Physician: Ingrid Griffith MD  Attending Physician:  Naomy East MD  Primary Car No date: SHOULDER SURG PROC UNLISTED      Comment: right shoulder-2012? Review of Systems    Positive for stated complaint: Patient states \"I need some help\" states \"I need someone to give me something f*  Other systems are as noted in HPI.   Laura Ethyl Alcohol Urine, Qualitative Positive (*)     All other components within normal limits    Narrative:     Results of the Urine Drug Screen should be used only for medical purposes.    PTT, ACTIVATED - Abnormal; Notable for the following:     PTT 25.2 ( Signed by Merle Ramirez MD on 8/11/2018 10:09 PM            H&P - H&P Note      H&P signed by Ingrid Griffith MD at 8/12/2018  2:16 AM     Author:  Ingrid Griffith MD Service:  (none) Author Type:  Physician    Filed:  8/12/2018  2:16 AM Date of Service:  8/1 apixaban 5 MG Oral Tab 2 pills bid x 6 days, then 1 pill bid Disp: 72 tablet Rfl: 2   multivitamin Oral Tab Take 1 tablet by mouth daily.  Disp:  Rfl: 0   Metoprolol Succinate ER 25 MG Oral Tablet 24 Hr Take 1 tablet (25 mg total) by mouth Daily Beta Blocke INR  1.09   --    --    --    --   0.95       Recent Labs   Lab  08/05/18   0607   08/07/18   0439  08/07/18   1625  08/08/18   0657  08/11/18   1555   GLU  94   < >  106*   --   115*  110*   BUN  10   < >  4*   --   9  6*   CREATSERUM  0.66*   < >  0.54* 8/12/2018 0959 Given 81 mg Oral Laura Dan RN      FLUoxetine HCl (PROZAC) cap 20 mg     Date Action Dose Route User    8/12/2018 0959 Given 20 mg Oral Pearly Fleischer, [place], RN      folic acid (FOLVITE) tab 1 mg     Date Action Dose Route User    8 BP  143/95  150/96  146/88  146/88  149/86     TD  TD  TD  TD  TD   Pulse  105  114  118  102  108     TD  TD  TD  TD  TD   Nausea and Vomiting      0             TD       Tactile Disturbances      0             TD       Tremor      1             TD

## 2018-08-13 NOTE — BH PROGRESS NOTE
Talked with Dr. Genesis Nicholson and he said, he will see the pt.   His nurse, Andre Gonzalez was informed to place a consult for the psychiatrist.

## 2018-08-13 NOTE — PROGRESS NOTES
PSYCH CONSULT    Date of Admission: 8/11/18  Date of Consult: 8/13/18  Reason for Consultation: Severe alcohol use disorder    Impression:  AXIS 1: Recent alcohol intoxication and delirium-resolved. Alcohol withdrawal syndrome without complication.  Severe

## 2018-08-13 NOTE — PLAN OF CARE
CARDIOVASCULAR - ADULT    • Maintains optimal cardiac output and hemodynamic stability Progressing        DISCHARGE PLANNING    • Discharge to home or other facility with appropriate resources Progressing        DRUG ABUSE/DETOX    • Will have no detox sym

## 2018-08-13 NOTE — CONSULTS
[facility]  Report of Psychiatric Consultation    Loly Khadra Patient Status:  Inpatient    3/3/1955 MRN [id number]   [facility] 3NE-A Attending Sheri Bull MD   Hosp Day # 1 PCP Susana Steele MD     Date of Admission: required over 12 mg of IV Ativan in the ED, so was admitted to [address] instead of [facility]. It is unclear if he truly had DT's in the past, since he has detoxed fine on the med floor and at [facility] in the past. He admits to getting a DUI 1 month ago. • Coronary atherosclerosis    • Depression    • Esophageal reflux    • Essential hypertension    • Heart attack ([facility]    • High blood pressure    • Hyperlipidemia    • Hypertension    • Myocardial infarction [facility])    • Osteoarthritis    • Seizure ([facility] tab 1 tablet, 1 tablet, Oral, Daily  •  folic acid (FOLVITE) tab 1 mg, 1 mg, Oral, Daily  •  apixaban (ELIQUIS) tab 10 mg, 10 mg, Oral, BID  •  [START ON 8/19/2018] apixaban (ELIQUIS) tab 5 mg, 5 mg, Oral, BID  •  LORazepam (ATIVAN) injection 2 mg, 2 mg, I

## 2018-08-13 NOTE — PLAN OF CARE
Pt alert and oriented, anxious at times. Denies pain, n/v.  VSS. Afebrile. CIWA q2 hours, scores 0-1. IVF. Tolerating diet. Ambulating in hallway, tolerating well. Plan for dc home today. Will monitor closely.     CARDIOVASCULAR - ADULT    • Maintain

## 2018-08-13 NOTE — BH LEVEL OF CARE ASSESSMENT
Level of Care Assessment Note    General Questions  Why are you here?: Pt states \"I need help. I cant stop drinking\" Pt states he has passive SI but denies any plan or intent to hamr himself.  Pt denies any HI   Precipitating Events: Pt states he called t Patient  In what setting is the screener performed?: in person  1. Have you wished you were dead or wished you could go to sleep and not wake up? (past 30 days): Yes  2. Have you actually had any thoughts of killing yourself? (past 30 days): No  6.  Have yo hopelessess; Increased irritability;Isolative  Depression Description: Pt endorsed all the above checked sx   Anxiety Symptoms: Generalized;Panic attack  Panic Attacks: Pt does not recall last panic attack.   Trauma Reaction:  (Pt denies.)  Bipolar Symptoms: Restlessness;Tremors;Sweating;Nausea; Irritability  Last Withdrawal Episode: [place]  Current Withdrawal Symptoms: Yes  Shared Symptoms: Anxiety; Restlessness;Irritability;Nausea    Compulsive Behaviors  Are you/others concerned about any of the following be have deteriorated, as well as the acuity of his current diagnoses. Judgment: Poor  Fair/poor judgment as evidenced by: Pt exhibits poor judgment due to the fact he recently had his car impounded due to drinking.   Thought Patterns  Clarity/Relevance: Coher questions  Transferred: No    Primary Psychiatric Diagnosis  Substance Related and Addictive Disorders: Alcohol-Related Disorder - Alcohol use Disorder  Primary Alcohol Use Disorder: Severe  Secondary Psychiatric Diagnoses  Anxiety Disorders: Unspecified A

## 2018-08-14 NOTE — DISCHARGE SUMMARY
[facility]  DISCHARGE SUMMARY     Loly Gaviria Patient Status:  Inpatient    3/3/1955 MRN [id number]   [facility] 3NE-A Attending No att. providers found   Hosp Day # 1 PCP Susana Stelee MD     Date of Admission: 20 · none    Consultants:  • psych    Discharge Medication List:     Discharge Medications      START taking these medications      Instructions Prescription details   HydrOXYzine HCl 50 MG Tabs  Commonly known as:  ATARAX      Take 1 tablet (50 mg total) b Where to Get Your Medications      Please  your prescriptions at the location directed by your doctor or nurse    Bring a paper prescription for each of these medications  · apixaban 5 MG Tabs  · ChlordiazePOXIDE HCl 5 MG Caps  · HydrOXYzine HCl 5

## 2018-08-20 ENCOUNTER — IMAGING SERVICES (OUTPATIENT)
Dept: OTHER | Age: 63
End: 2018-08-20

## 2018-08-20 ENCOUNTER — HOSPITAL (OUTPATIENT)
Dept: OTHER | Age: 63
End: 2018-08-20
Attending: INTERNAL MEDICINE

## 2018-08-20 ENCOUNTER — CHARTING TRANS (OUTPATIENT)
Dept: OTHER | Age: 63
End: 2018-08-20

## 2018-08-20 LAB
ALBUMIN SERPL-MCNC: 3.2 GM/DL (ref 3.6–5.1)
ALBUMIN/GLOB SERPL: 0.8 {RATIO} (ref 1–2.4)
ALP SERPL-CCNC: 100 UNIT/L (ref 45–117)
ALT SERPL-CCNC: 154 UNIT/L
AMPHETAMINES UR QL SCN>500 NG/ML: NEGATIVE
ANALYZER ANC (IANC): ABNORMAL
ANION GAP SERPL CALC-SCNC: 15 MMOL/L (ref 10–20)
APTT PPP: 21 SECONDS (ref 22–30)
APTT PPP: ABNORMAL S
AST SERPL-CCNC: 123 UNIT/L
BARBITURATES UR QL SCN>200 NG/ML: NEGATIVE
BASOPHILS # BLD: 0.1 THOUSAND/MCL (ref 0–0.3)
BASOPHILS NFR BLD: 1 %
BENZODIAZ UR QL SCN>200 NG/ML: POSITIVE
BILIRUB SERPL-MCNC: 0.5 MG/DL (ref 0.2–1)
BUN SERPL-MCNC: 8 MG/DL (ref 6–20)
BUN/CREAT SERPL: 8 (ref 7–25)
BZE UR QL SCN>150 NG/ML: NEGATIVE
CALCIUM SERPL-MCNC: 8 MG/DL (ref 8.4–10.2)
CANNABINOIDS UR QL SCN>50 NG/ML: NEGATIVE
CHLORIDE: 107 MMOL/L (ref 98–107)
CO2 SERPL-SCNC: 26 MMOL/L (ref 21–32)
CREAT SERPL-MCNC: 0.98 MG/DL (ref 0.67–1.17)
DIFFERENTIAL METHOD BLD: ABNORMAL
EOSINOPHIL # BLD: 0.3 THOUSAND/MCL (ref 0.1–0.5)
EOSINOPHIL NFR BLD: 7 %
ERYTHROCYTE [DISTWIDTH] IN BLOOD: 14.2 % (ref 11–15)
ETHANOL SERPL-MCNC: 339 MG/DL
GLOBULIN SER-MCNC: 3.8 GM/DL (ref 2–4)
GLUCOSE SERPL-MCNC: 101 MG/DL (ref 65–99)
HEMATOCRIT: 38.8 % (ref 39–51)
HGB BLD-MCNC: 12.8 GM/DL (ref 13–17)
INR PPP: 1
LYMPHOCYTES # BLD: 1.7 THOUSAND/MCL (ref 1–4)
LYMPHOCYTES NFR BLD: 43 %
MAGNESIUM SERPL-MCNC: 2 MG/DL (ref 1.7–2.4)
MCH RBC QN AUTO: 31.8 PG (ref 26–34)
MCHC RBC AUTO-ENTMCNC: 33 GM/DL (ref 32–36.5)
MCV RBC AUTO: 96.3 FL (ref 78–100)
MONOCYTES # BLD: 0.5 THOUSAND/MCL (ref 0.3–0.9)
MONOCYTES NFR BLD: 13 %
NEUTROPHILS # BLD: 1.4 THOUSAND/MCL (ref 1.8–7.7)
NEUTROPHILS NFR BLD: 36 %
NEUTS SEG NFR BLD: ABNORMAL %
NRBC (NRBCRE): ABNORMAL
OPIATES UR QL SCN>300 NG/ML: NEGATIVE
PCP UR QL SCN>25 NG/ML: NEGATIVE
PLATELET # BLD: 242 THOUSAND/MCL (ref 140–450)
POTASSIUM SERPL-SCNC: 3.9 MMOL/L (ref 3.4–5.1)
PROT SERPL-MCNC: 7 GM/DL (ref 6.4–8.2)
PROTHROMBIN TIME: 10.1 SECONDS (ref 9.7–11.8)
PROTHROMBIN TIME: NORMAL
RBC # BLD: 4.03 MILLION/MCL (ref 4.5–5.9)
SODIUM SERPL-SCNC: 144 MMOL/L (ref 135–145)
WBC # BLD: 3.8 THOUSAND/MCL (ref 4.2–11)

## 2018-08-21 ENCOUNTER — IMAGING SERVICES (OUTPATIENT)
Dept: OTHER | Age: 63
End: 2018-08-21

## 2018-08-21 ENCOUNTER — CHARTING TRANS (OUTPATIENT)
Dept: OTHER | Age: 63
End: 2018-08-21

## 2018-08-21 LAB
ALBUMIN SERPL-MCNC: 2.9 GM/DL (ref 3.6–5.1)
ALBUMIN/GLOB SERPL: 0.7 {RATIO} (ref 1–2.4)
ALP SERPL-CCNC: 102 UNIT/L (ref 45–117)
ALT SERPL-CCNC: 109 UNIT/L
AMMONIA PLAS-SCNC: 18 MCMOL/L
ANALYZER ANC (IANC): ABNORMAL
ANION GAP SERPL CALC-SCNC: 12 MMOL/L (ref 10–20)
AST SERPL-CCNC: 63 UNIT/L
BASOPHILS # BLD: 0 THOUSAND/MCL (ref 0–0.3)
BASOPHILS NFR BLD: 0 %
BILIRUB SERPL-MCNC: 1.1 MG/DL (ref 0.2–1)
BUN SERPL-MCNC: 9 MG/DL (ref 6–20)
BUN/CREAT SERPL: 12 (ref 7–25)
CALCIUM SERPL-MCNC: 8.4 MG/DL (ref 8.4–10.2)
CHLORIDE: 102 MMOL/L (ref 98–107)
CO2 SERPL-SCNC: 28 MMOL/L (ref 21–32)
CREAT SERPL-MCNC: 0.77 MG/DL (ref 0.67–1.17)
DIFFERENTIAL METHOD BLD: ABNORMAL
EOSINOPHIL # BLD: 0.3 THOUSAND/MCL (ref 0.1–0.5)
EOSINOPHIL NFR BLD: 6 %
ERYTHROCYTE [DISTWIDTH] IN BLOOD: 13.7 % (ref 11–15)
GLOBULIN SER-MCNC: 3.9 GM/DL (ref 2–4)
GLUCOSE SERPL-MCNC: 106 MG/DL (ref 65–99)
HEMATOCRIT: 39.2 % (ref 39–51)
HGB BLD-MCNC: 13.1 GM/DL (ref 13–17)
LYMPHOCYTES # BLD: 1.5 THOUSAND/MCL (ref 1–4)
LYMPHOCYTES NFR BLD: 36 %
MCH RBC QN AUTO: 32 PG (ref 26–34)
MCHC RBC AUTO-ENTMCNC: 33.4 GM/DL (ref 32–36.5)
MCV RBC AUTO: 95.8 FL (ref 78–100)
MONOCYTES # BLD: 0.6 THOUSAND/MCL (ref 0.3–0.9)
MONOCYTES NFR BLD: 15 %
NEUTROPHILS # BLD: 1.8 THOUSAND/MCL (ref 1.8–7.7)
NEUTROPHILS NFR BLD: 43 %
NEUTS SEG NFR BLD: ABNORMAL %
NRBC (NRBCRE): ABNORMAL
PLATELET # BLD: 253 THOUSAND/MCL (ref 140–450)
POTASSIUM SERPL-SCNC: 3.4 MMOL/L (ref 3.4–5.1)
PROT SERPL-MCNC: 6.8 GM/DL (ref 6.4–8.2)
RBC # BLD: 4.09 MILLION/MCL (ref 4.5–5.9)
SODIUM SERPL-SCNC: 139 MMOL/L (ref 135–145)
WBC # BLD: 4.1 THOUSAND/MCL (ref 4.2–11)

## 2018-08-22 LAB
ALBUMIN SERPL-MCNC: 3.1 GM/DL (ref 3.6–5.1)
ALBUMIN/GLOB SERPL: 0.8 {RATIO} (ref 1–2.4)
ALP SERPL-CCNC: 107 UNIT/L (ref 45–117)
ALT SERPL-CCNC: 91 UNIT/L
ANALYZER ANC (IANC): ABNORMAL
ANION GAP SERPL CALC-SCNC: 13 MMOL/L (ref 10–20)
AST SERPL-CCNC: 56 UNIT/L
BASE DEFICIT BLDA-SCNC: ABNORMAL MMOL/L
BASE EXCESS BLDA CALC-SCNC: 5 MMOL/L (ref 0–3)
BASOPHILS # BLD: 0 THOUSAND/MCL (ref 0–0.3)
BASOPHILS NFR BLD: 1 %
BDY SITE: ABNORMAL
BILIRUB SERPL-MCNC: 0.5 MG/DL (ref 0.2–1)
BODY TEMPERATURE: 37 DEGREES
BUN SERPL-MCNC: 7 MG/DL (ref 6–20)
BUN/CREAT SERPL: 9 (ref 7–25)
CALCIUM SERPL-MCNC: 9 MG/DL (ref 8.4–10.2)
CHLORIDE: 104 MMOL/L (ref 98–107)
CO2 SERPL-SCNC: 29 MMOL/L (ref 21–32)
CONDITION: ABNORMAL
CONDITION: ABNORMAL
CREAT SERPL-MCNC: 0.78 MG/DL (ref 0.67–1.17)
CREAT SERPL-MCNC: 0.79 MG/DL (ref 0.67–1.17)
DIFFERENTIAL METHOD BLD: ABNORMAL
EOSINOPHIL # BLD: 0.4 THOUSAND/MCL (ref 0.1–0.5)
EOSINOPHIL NFR BLD: 7 %
ERYTHROCYTE [DISTWIDTH] IN BLOOD: 14.2 % (ref 11–15)
GLOBULIN SER-MCNC: 4.1 GM/DL (ref 2–4)
GLUCOSE SERPL-MCNC: 135 MG/DL (ref 65–99)
HCO3 BLDA-SCNC: 29 MMOL/L (ref 22–28)
HEMATOCRIT: 40.3 % (ref 39–51)
HGB BLD-MCNC: 13.3 GM/DL (ref 13–17)
HOROWITZ INDEX BLD+IHG-RTO: ABNORMAL MM[HG]
LYMPHOCYTES # BLD: 1.3 THOUSAND/MCL (ref 1–4)
LYMPHOCYTES NFR BLD: 26 %
MAGNESIUM SERPL-MCNC: 1.9 MG/DL (ref 1.7–2.4)
MCH RBC QN AUTO: 32.1 PG (ref 26–34)
MCHC RBC AUTO-ENTMCNC: 33 GM/DL (ref 32–36.5)
MCV RBC AUTO: 97.3 FL (ref 78–100)
MONOCYTES # BLD: 0.5 THOUSAND/MCL (ref 0.3–0.9)
MONOCYTES NFR BLD: 10 %
NEUTROPHILS # BLD: 3 THOUSAND/MCL (ref 1.8–7.7)
NEUTROPHILS NFR BLD: 56 %
NEUTS SEG NFR BLD: ABNORMAL %
NRBC (NRBCRE): ABNORMAL
PAO2 / FIO2 RATIO (RPFR): ABNORMAL
PCO2 BLDA: 41 MM HG (ref 35–48)
PH BLDA: 7.46 UNIT (ref 7.35–7.45)
PLATELET # BLD: 247 THOUSAND/MCL (ref 140–450)
PO2 BLDA: 70 MM HG (ref 83–108)
POTASSIUM SERPL-SCNC: 3.6 MMOL/L (ref 3.4–5.1)
POTASSIUM SERPL-SCNC: 4.6 MMOL/L (ref 3.4–5.1)
PROT SERPL-MCNC: 7.2 GM/DL (ref 6.4–8.2)
RBC # BLD: 4.14 MILLION/MCL (ref 4.5–5.9)
SAO2 % BLDA: 95 % (ref 95–99)
SODIUM SERPL-SCNC: 141 MMOL/L (ref 135–145)
WBC # BLD: 5.2 THOUSAND/MCL (ref 4.2–11)

## 2018-09-13 PROBLEM — F10.10 ALCOHOL ABUSE: Status: ACTIVE | Noted: 2018-09-13

## 2018-09-14 NOTE — PAYOR COMM NOTE
--------------  ADMISSION REVIEW     Payor: [facility]  Subscriber #:  [id number]  Authorization Number: N/A    Admit date: N/A  Admit time: N/A       Admitting Physician: Vandana Bolaños MD  Attending Physician:  Opal Martinez MD  Primary Care [facility]ici REPLACEMENT SURGERY  No date: SHOULDER SURG PROC UNLISTED      Comment:  right shoulder-2012?         Social History    Tobacco Use      Smoking status: Former Smoker        Packs/day: 2.00        Years: 20.00        Pack years: 40        Types: Cigarettes Alcohol 348 (*)     All other components within normal limits   DRUG SCREEN 7 W/OUT CONFIRMATION, URINE - Abnormal; Notable for the following components:    Ethyl Alcohol Urine, Qualitative Positive (*)     All other components within normal limits    Narr symptoms before. Patient is tachycardic here in the emergency department he is tearful but redirectable. Given lorazepam admitted to the hospital with concern he is likely going to withdrawal from alcohol.   He was started on the [place] protocol and using any N/V/D/C or abd pain. Pt has had admission in the past with DT's and withdrawal seizures. He otherwise denies any F/C.      Past Medical History:  Past Medical History:   Diagnosis Date   • Alcohol abuse    • Anxiety state    • Coronary atherosclerosis 1 MG Oral Tab Take 1 tablet (1 mg total) by mouth daily. Disp: 30 tablet Rfl: 0   Thiamine HCl 100 MG Oral Tab Take 1 tablet (100 mg total) by mouth daily. Disp: 30 tablet Rfl: 0   multivitamin Oral Tab Take 1 tablet by mouth daily.  Disp:  Rfl: 0   Metopro BILT  0.7   TP  7.8       Estimated Creatinine Clearance: 70.5 mL/min (based on SCr of 0.98 mg/dL). No results for input(s): PTP, INR in the last 168 hours. No results for input(s): TROP, CK in the last 168 hours.     Imaging: Imaging data reviewed HCl 725 mg, folic acid (FOLVITE) 1 mg in dextrose 5 % 1,000 mL infusion     Date Action Dose Route User    9/13/2018 2305 New Bag 1000 mL/hr Intravenous Paula Petty RN      Potassium Chloride ER (K-DUR M20) CR tab 40 mEq     Date Action Dose Route

## 2018-09-14 NOTE — PROGRESS NOTES
[facility]  Progress Note     Jennifer Cassiuschichi Patient Status:  Observation    3/3/1955 MRN [id number]   [facility] 3NE-A Attending Nisha Seals MD   Hosp Day # 0 PCP Titi Santana MD     Chief Complaint: Alcohol intoxica Estimated Creatinine Clearance: 70.5 mL/min (based on SCr of 0.98 mg/dL). Recent Labs   Lab  09/14/18   0038   PTP  14.2   INR  1.06       No results for input(s): TROP, CK in the last 168 hours. Imaging: Imaging data reviewed in Epic.

## 2018-09-14 NOTE — ED PROVIDER NOTES
Patient Seen in: [facility] Emergency Department    History   Patient presents with:  Alcohol Intoxication (neurologic)  Eval-P (psychiatric)    Stated Complaint: ALCOHOL INTOXICATION, REQUESTING DETOX     HPI    Presents to the emergency department stated complaint: ALCOHOL INTOXICATION, REQUESTING DETOX   Other systems are as noted in HPI. Constitutional and vital signs reviewed. All other systems reviewed and negative except as noted above.     Physical Exam     ED Triage Vitals [09/13/18 2120 components within normal limits    Narrative:     Results of the Urine Drug Screen should be used only for medical purposes.    ACETAMINOPHEN (TYLENOL), S - Normal   SALICYLATE, SERUM - Normal   PROTHROMBIN TIME (PT) - Normal   PTT, ACTIVATED - Normal   CBC provider specified.       Medications Prescribed:  Current Discharge Medication List        Present on Admission  Date Reviewed: 5/8/2018          ICD-10-CM Noted POA    * (Principal) Alcohol intoxication with delirium [facility]) F10.921 8/4/2018 Unknown    [place]

## 2018-09-14 NOTE — PLAN OF CARE
NURSING ADMISSION NOTE      Patient admitted via Cart  Oriented to room. Safety precautions initiated. Bed in low position. Call light in reach. Pt is drowsy on arrival to the floor.  Is oriented x4, no complaints of pain on admission, 3L O2 requi

## 2018-09-14 NOTE — CM/SW NOTE
Patient was screened during rounds, no needs were identified at this time. Rn to contact SW/CM if needs arise.      Jeannie Vale RN,   Phone [phone]  Pager [phone]

## 2018-09-14 NOTE — ED NOTES
Pt is tearful and asking for alcohol detox. Pt states \" I don't feel good. I need help. \" Pt denies any SI/HI.

## 2018-09-14 NOTE — H&P
[facility]  History and Physical     Proter Nicole Patient Status:  Emergency    3/3/1955 MRN [id number]   Location [address] [facility] Attending Jimena Garber MD   Hosp Day # 0 PCP Elisa Miller MD     Chief Compla Known Allergies    Medications:    No current facility-administered medications on file prior to encounter.    Current Outpatient Medications on File Prior to Encounter:  apixaban 5 MG Oral Tab Take 2 tabs (10mg) by mouth twice daily for 7 days, then take 1 deformity. Abdomen: Soft, nontender, nondistended. Positive bowel sounds. No rebound, guarding or organomegaly. Neurologic: No focal neurological deficits. CNII-XII grossly intact. Musculoskeletal: Moves all extremities.   Extremities: No edema or cyano

## 2018-09-14 NOTE — PLAN OF CARE
Pt alert and oriented. Denies pain, n/v.  IVF.  VSS. Afebrile. K replaced per protocol, redraw this PM.  CIWA q2hrs;scores 1-7. No sweating, tremors noted. Pt adamant about receiving ativan frequently for anxiety.   Pt noted to be falling asleep shortly

## 2018-09-14 NOTE — CONSULTS
[facility]  Report of Psychiatric Consultation    Ra Morris Patient Status:  Observation    3/3/1955 MRN [id number]   [facility] 3NE-A Attending Sarah Esparza MD   Hosp Day # 0 PCP Bello Pak MD     Date of Admissio immediately. He had a recent DUI in July 2018, so can't drive. He says his son is angry at him, but it wasn't an issue of getting rides, he just didn't want to go. He has been drinking 1/5 gallon vodka and 12 beers daily.  His recent stressors included f 2007?  No date: HERNIA SURGERY  No date: KNEE REPLACEMENT SURGERY  No date: SHOULDER SURG PROC UNLISTED      Comment:  right shoulder-2012?   Family History   Problem Relation Age of Onset   • Hypertension Father    • Lipids Father    • Heart Disorder Fathe Mental Status Exam:     Risk Assessment  Suicidal ideation: no suicidal ideation  Homicidal ideation: None noted    Objective       09/14/18  0800   BP:    Pulse:    Resp: 22   Temp: 98.2 °F (36.8 °C)       Appearance: poor hygiene  Behavior: normal ps

## 2018-09-14 NOTE — ED INITIAL ASSESSMENT (HPI)
Patient here via EMS, intoxicated, requesting detox. Reports drinking more heavily than normal, patient reports drinking a fifth of vodka today, last drink approximately 1700 today. Patient covered in own feces and urine.

## 2018-09-15 NOTE — DISCHARGE SUMMARY
[facility]  DISCHARGE SUMMARY     Arvind Bailey Patient Status:  Observation    3/3/1955 MRN [id number]   [facility] 3NE-A Attending Zo Blackman MD   Hosp Day # 0 PCP Beth Kohler MD     Date of Admission: 2018 this  · reasons to take this      Take 1 tablet (50 mg total) by mouth 3 (three) times daily.    Quantity:  30 tablet  Refills:  0        CONTINUE taking these medications      Instructions Prescription details   apixaban 5 MG Tabs  Commonly known as:  Melvina Jacobson DISCHARGE INSTRUCTIONS: See electronic chart    Koffi Vickers MD 9/15/2018    Time spent:  > 30 minutes

## 2018-09-15 NOTE — PLAN OF CARE
Risk for Violence-Violent Restraints/Seclusion    • Patient will not express any violent or self-destructive behaviors Completed          DISCHARGE PLANNING    • Discharge to home or other facility with appropriate resources Progressing        DRUG ABUSE/D

## 2018-09-15 NOTE — PROGRESS NOTES
16 Fr Loyola inserted. Patient given morphine 2mg IVP prior to loyola insertion. Leg bag teaching given. Over night loyola bag teaching done. Patient to follow up with dr. Bianka Landa on Tuesday or Wednesday for loyola trial.   Updates given to Dr. Olivia DOVE

## 2018-09-15 NOTE — CONSULTS
[facility] Urology  Consultation Note    Italo david Patient Status:  Observation    3/3/1955 MRN [id number]   [facility] 3NE-A Attending Job Dial, MD   Hosp Day # 0 PCP Mira Vu MD     Reason for Consultat of alcohol per week. He reports that he does not use drugs.     Allergies:  No Known Allergies    Medications:    Current Facility-Administered Medications:   •  apixaban (ELIQUIS) tab 5 mg, 5 mg, Oral, BID  •  aspirin chewable tab 81 mg, 81 mg, Oral, Daily deferred   NEURO: Alert and Oriented, motor and sensory grossly non-focal   SKIN: No rashes, no discoloration  EXTREMITIES: No edema or cyanosis, no calf pain    Laboratory Data:  Lab Results   Component Value Date    CREATSERUM 0.68 09/15/2018    BUN 11 0

## 2018-09-15 NOTE — BH PROGRESS NOTE
Went to see the pt per request of Dr. Armani Rausch. The pt said, he does not want to be interrogated about his drinking. Informed him Dr. Armani Rausch just wanted him to have cd referrals. The pt said, he doesn't feel well today.   He said, he was thinking about an IOP

## 2018-09-15 NOTE — PROGRESS NOTES
[facility]  Progress Note     Jeremy Manrique Patient Status:  Observation    3/3/1955 MRN [id number]   [facility] 3NE-A Attending Kiesha Muniz MD   Hosp Day # 0 PCP Diana Kaba MD     Chief Complaint: Alcohol intoxica data reviewed in Epic.     Medications:   • apixaban  5 mg Oral BID   • aspirin  81 mg Oral Daily   • FLUoxetine HCl  20 mg Oral Daily   • Metoprolol Succinate ER  25 mg Oral Daily Beta Blocker   • multivitamin  1 tablet Oral Daily   • Thiamine HCl  100 mg

## 2018-09-15 NOTE — PLAN OF CARE
DISCHARGE PLANNING    • Discharge to home or other facility with appropriate resources Progressing        DRUG ABUSE/DETOX    • Will have no detox symptoms and will verbalize plan for changing drug-related behavior Progressing        GENITOURINARY - ADULT

## 2018-10-12 ENCOUNTER — HOSPITAL (OUTPATIENT)
Dept: OTHER | Age: 63
End: 2018-10-12
Attending: INTERNAL MEDICINE

## 2018-10-12 LAB
ANALYZER ANC (IANC): NORMAL
ANION GAP SERPL CALC-SCNC: 17 MMOL/L (ref 10–20)
BASOPHILS # BLD: 0 THOUSAND/MCL (ref 0–0.3)
BASOPHILS NFR BLD: 1 %
BUN SERPL-MCNC: 6 MG/DL (ref 6–20)
BUN/CREAT SERPL: 8 (ref 7–25)
CALCIUM SERPL-MCNC: 8.7 MG/DL (ref 8.4–10.2)
CHLORIDE: 99 MMOL/L (ref 98–107)
CO2 SERPL-SCNC: 26 MMOL/L (ref 21–32)
CREAT SERPL-MCNC: 0.74 MG/DL (ref 0.67–1.17)
DIFFERENTIAL METHOD BLD: NORMAL
EOSINOPHIL # BLD: 0.1 THOUSAND/MCL (ref 0.1–0.5)
EOSINOPHIL NFR BLD: 2 %
ERYTHROCYTE [DISTWIDTH] IN BLOOD: 13.1 % (ref 11–15)
ETHANOL SERPL-MCNC: 329 MG/DL
GLUCOSE SERPL-MCNC: 136 MG/DL (ref 65–99)
HEMATOCRIT: 42.2 % (ref 39–51)
HGB BLD-MCNC: 14.8 GM/DL (ref 13–17)
LYMPHOCYTES # BLD: 2.5 THOUSAND/MCL (ref 1–4)
LYMPHOCYTES NFR BLD: 40 %
MCH RBC QN AUTO: 30.8 PG (ref 26–34)
MCHC RBC AUTO-ENTMCNC: 35.1 GM/DL (ref 32–36.5)
MCV RBC AUTO: 87.9 FL (ref 78–100)
MONOCYTES # BLD: 0.8 THOUSAND/MCL (ref 0.3–0.9)
MONOCYTES NFR BLD: 12 %
NEUTROPHILS # BLD: 2.9 THOUSAND/MCL (ref 1.8–7.7)
NEUTROPHILS NFR BLD: 45 %
NEUTS SEG NFR BLD: NORMAL %
NRBC (NRBCRE): NORMAL
PLATELET # BLD: 202 THOUSAND/MCL (ref 140–450)
POTASSIUM SERPL-SCNC: 4.1 MMOL/L (ref 3.4–5.1)
RBC # BLD: 4.8 MILLION/MCL (ref 4.5–5.9)
SODIUM SERPL-SCNC: 138 MMOL/L (ref 135–145)
WBC # BLD: 6.3 THOUSAND/MCL (ref 4.2–11)

## 2018-10-13 ENCOUNTER — CHARTING TRANS (OUTPATIENT)
Dept: OTHER | Age: 63
End: 2018-10-13

## 2018-10-13 LAB
ALBUMIN SERPL-MCNC: 3.1 GM/DL (ref 3.6–5.1)
ALBUMIN/GLOB SERPL: 0.8 {RATIO} (ref 1–2.4)
ALP SERPL-CCNC: 84 UNIT/L (ref 45–117)
ALT SERPL-CCNC: 31 UNIT/L
AMPHETAMINES UR QL SCN>500 NG/ML: NEGATIVE
ANALYZER ANC (IANC): ABNORMAL
ANION GAP SERPL CALC-SCNC: 16 MMOL/L (ref 10–20)
AST SERPL-CCNC: 41 UNIT/L
BARBITURATES UR QL SCN>200 NG/ML: NEGATIVE
BASOPHILS # BLD: 0 THOUSAND/MCL (ref 0–0.3)
BASOPHILS NFR BLD: 0 %
BENZODIAZ UR QL SCN>200 NG/ML: NEGATIVE
BILIRUB SERPL-MCNC: 0.6 MG/DL (ref 0.2–1)
BUN SERPL-MCNC: 9 MG/DL (ref 6–20)
BUN/CREAT SERPL: 12 (ref 7–25)
BZE UR QL SCN>150 NG/ML: NEGATIVE
CALCIUM SERPL-MCNC: 8.4 MG/DL (ref 8.4–10.2)
CANNABINOIDS UR QL SCN>50 NG/ML: NEGATIVE
CHLORIDE: 103 MMOL/L (ref 98–107)
CO2 SERPL-SCNC: 25 MMOL/L (ref 21–32)
CREAT SERPL-MCNC: 0.75 MG/DL (ref 0.67–1.17)
DIFFERENTIAL METHOD BLD: ABNORMAL
EOSINOPHIL # BLD: 0.2 THOUSAND/MCL (ref 0.1–0.5)
EOSINOPHIL NFR BLD: 3 %
ERYTHROCYTE [DISTWIDTH] IN BLOOD: 13.3 % (ref 11–15)
GLOBULIN SER-MCNC: 4.1 GM/DL (ref 2–4)
GLUCOSE SERPL-MCNC: 122 MG/DL (ref 65–99)
HEMATOCRIT: 39.1 % (ref 39–51)
HGB BLD-MCNC: 13.2 GM/DL (ref 13–17)
LYMPHOCYTES # BLD: 2 THOUSAND/MCL (ref 1–4)
LYMPHOCYTES NFR BLD: 30 %
MAGNESIUM SERPL-MCNC: 1.9 MG/DL (ref 1.7–2.4)
MCH RBC QN AUTO: 30.1 PG (ref 26–34)
MCHC RBC AUTO-ENTMCNC: 33.8 GM/DL (ref 32–36.5)
MCV RBC AUTO: 89.3 FL (ref 78–100)
MONOCYTES # BLD: 0.7 THOUSAND/MCL (ref 0.3–0.9)
MONOCYTES NFR BLD: 10 %
NEUTROPHILS # BLD: 3.8 THOUSAND/MCL (ref 1.8–7.7)
NEUTROPHILS NFR BLD: 57 %
NEUTS SEG NFR BLD: ABNORMAL %
NRBC (NRBCRE): ABNORMAL
OPIATES UR QL SCN>300 NG/ML: NEGATIVE
PCP UR QL SCN>25 NG/ML: NEGATIVE
PHOSPHATE SERPL-MCNC: 3.2 MG/DL (ref 2.4–4.7)
PLATELET # BLD: 185 THOUSAND/MCL (ref 140–450)
POTASSIUM SERPL-SCNC: 4.1 MMOL/L (ref 3.4–5.1)
PROT SERPL-MCNC: 7.2 GM/DL (ref 6.4–8.2)
RBC # BLD: 4.38 MILLION/MCL (ref 4.5–5.9)
SODIUM SERPL-SCNC: 140 MMOL/L (ref 135–145)
WBC # BLD: 6.7 THOUSAND/MCL (ref 4.2–11)

## 2018-10-14 LAB
ALBUMIN SERPL-MCNC: 2.7 GM/DL (ref 3.6–5.1)
ALBUMIN/GLOB SERPL: 0.7 {RATIO} (ref 1–2.4)
ALP SERPL-CCNC: 88 UNIT/L (ref 45–117)
ALT SERPL-CCNC: 24 UNIT/L
ANALYZER ANC (IANC): ABNORMAL
ANION GAP SERPL CALC-SCNC: 12 MMOL/L (ref 10–20)
AST SERPL-CCNC: 27 UNIT/L
BASOPHILS # BLD: 0 THOUSAND/MCL (ref 0–0.3)
BASOPHILS NFR BLD: 1 %
BILIRUB SERPL-MCNC: 0.7 MG/DL (ref 0.2–1)
BUN SERPL-MCNC: 12 MG/DL (ref 6–20)
BUN/CREAT SERPL: 15 (ref 7–25)
CALCIUM SERPL-MCNC: 8.6 MG/DL (ref 8.4–10.2)
CHLORIDE: 102 MMOL/L (ref 98–107)
CO2 SERPL-SCNC: 27 MMOL/L (ref 21–32)
CREAT SERPL-MCNC: 0.79 MG/DL (ref 0.67–1.17)
DIFFERENTIAL METHOD BLD: ABNORMAL
EOSINOPHIL # BLD: 0.4 THOUSAND/MCL (ref 0.1–0.5)
EOSINOPHIL NFR BLD: 7 %
ERYTHROCYTE [DISTWIDTH] IN BLOOD: 13.3 % (ref 11–15)
GLOBULIN SER-MCNC: 4 GM/DL (ref 2–4)
GLUCOSE SERPL-MCNC: 112 MG/DL (ref 65–99)
HEMATOCRIT: 39.1 % (ref 39–51)
HGB BLD-MCNC: 12.9 GM/DL (ref 13–17)
LYMPHOCYTES # BLD: 1.1 THOUSAND/MCL (ref 1–4)
LYMPHOCYTES NFR BLD: 19 %
MCH RBC QN AUTO: 30.1 PG (ref 26–34)
MCHC RBC AUTO-ENTMCNC: 33 GM/DL (ref 32–36.5)
MCV RBC AUTO: 91.4 FL (ref 78–100)
MONOCYTES # BLD: 0.6 THOUSAND/MCL (ref 0.3–0.9)
MONOCYTES NFR BLD: 11 %
NEUTROPHILS # BLD: 3.6 THOUSAND/MCL (ref 1.8–7.7)
NEUTROPHILS NFR BLD: 62 %
NEUTS SEG NFR BLD: ABNORMAL %
NRBC (NRBCRE): ABNORMAL
PLATELET # BLD: 151 THOUSAND/MCL (ref 140–450)
POTASSIUM SERPL-SCNC: 3.7 MMOL/L (ref 3.4–5.1)
PROT SERPL-MCNC: 6.7 GM/DL (ref 6.4–8.2)
RBC # BLD: 4.28 MILLION/MCL (ref 4.5–5.9)
SODIUM SERPL-SCNC: 137 MMOL/L (ref 135–145)
WBC # BLD: 5.7 THOUSAND/MCL (ref 4.2–11)

## 2018-10-28 LAB
ANALYZER ANC (IANC): NORMAL
ANION GAP SERPL CALC-SCNC: 16 MMOL/L (ref 10–20)
BASOPHILS # BLD: 0 THOUSAND/MCL (ref 0–0.3)
BASOPHILS NFR BLD: 0 %
BUN SERPL-MCNC: 5 MG/DL (ref 6–20)
BUN/CREAT SERPL: 7 (ref 7–25)
CALCIUM SERPL-MCNC: 7.9 MG/DL (ref 8.4–10.2)
CHLORIDE: 93 MMOL/L (ref 98–107)
CO2 SERPL-SCNC: 24 MMOL/L (ref 21–32)
CREAT SERPL-MCNC: 0.68 MG/DL (ref 0.67–1.17)
DIFFERENTIAL METHOD BLD: NORMAL
EOSINOPHIL # BLD: 0.1 THOUSAND/MCL (ref 0.1–0.5)
EOSINOPHIL NFR BLD: 1 %
ERYTHROCYTE [DISTWIDTH] IN BLOOD: 13.5 % (ref 11–15)
ETHANOL SERPL-MCNC: 375 MG/DL
GLUCOSE SERPL-MCNC: 141 MG/DL (ref 65–99)
HEMATOCRIT: 42.2 % (ref 39–51)
HGB BLD-MCNC: 15 GM/DL (ref 13–17)
LYMPHOCYTES # BLD: 1.6 THOUSAND/MCL (ref 1–4)
LYMPHOCYTES NFR BLD: 37 %
MAGNESIUM SERPL-MCNC: 1.9 MG/DL (ref 1.7–2.4)
MCH RBC QN AUTO: 30.9 PG (ref 26–34)
MCHC RBC AUTO-ENTMCNC: 35.5 GM/DL (ref 32–36.5)
MCV RBC AUTO: 86.8 FL (ref 78–100)
MONOCYTES # BLD: 0.5 THOUSAND/MCL (ref 0.3–0.9)
MONOCYTES NFR BLD: 11 %
NEUTROPHILS # BLD: 2.2 THOUSAND/MCL (ref 1.8–7.7)
NEUTROPHILS NFR BLD: 51 %
NEUTS SEG NFR BLD: NORMAL %
NRBC (NRBCRE): NORMAL
PLATELET # BLD: 251 THOUSAND/MCL (ref 140–450)
POTASSIUM SERPL-SCNC: 3.9 MMOL/L (ref 3.4–5.1)
RBC # BLD: 4.86 MILLION/MCL (ref 4.5–5.9)
SODIUM SERPL-SCNC: 129 MMOL/L (ref 135–145)
WBC # BLD: 4.3 THOUSAND/MCL (ref 4.2–11)

## 2018-10-29 ENCOUNTER — HOSPITAL (OUTPATIENT)
Dept: OTHER | Age: 63
End: 2018-10-29
Attending: INTERNAL MEDICINE

## 2018-10-29 LAB
ALBUMIN SERPL-MCNC: 2.9 GM/DL (ref 3.6–5.1)
ALBUMIN/GLOB SERPL: 0.7 {RATIO} (ref 1–2.4)
ALP SERPL-CCNC: 115 UNIT/L (ref 45–117)
ALT SERPL-CCNC: 43 UNIT/L
ANALYZER ANC (IANC): NORMAL
ANION GAP SERPL CALC-SCNC: 13 MMOL/L (ref 10–20)
ANION GAP SERPL CALC-SCNC: 16 MMOL/L (ref 10–20)
ANION GAP SERPL CALC-SCNC: 17 MMOL/L (ref 10–20)
AST SERPL-CCNC: 65 UNIT/L
BASOPHILS # BLD: 0 THOUSAND/MCL (ref 0–0.3)
BASOPHILS NFR BLD: 0 %
BILIRUB SERPL-MCNC: 0.7 MG/DL (ref 0.2–1)
BUN SERPL-MCNC: 10 MG/DL (ref 6–20)
BUN SERPL-MCNC: 5 MG/DL (ref 6–20)
BUN SERPL-MCNC: 6 MG/DL (ref 6–20)
BUN/CREAT SERPL: 14 (ref 7–25)
BUN/CREAT SERPL: 8 (ref 7–25)
BUN/CREAT SERPL: 8 (ref 7–25)
CALCIUM SERPL-MCNC: 7.9 MG/DL (ref 8.4–10.2)
CALCIUM SERPL-MCNC: 8.1 MG/DL (ref 8.4–10.2)
CALCIUM SERPL-MCNC: 8.2 MG/DL (ref 8.4–10.2)
CHLORIDE: 98 MMOL/L (ref 98–107)
CHLORIDE: 99 MMOL/L (ref 98–107)
CHLORIDE: 99 MMOL/L (ref 98–107)
CO2 SERPL-SCNC: 24 MMOL/L (ref 21–32)
CO2 SERPL-SCNC: 24 MMOL/L (ref 21–32)
CO2 SERPL-SCNC: 27 MMOL/L (ref 21–32)
CREAT SERPL-MCNC: 0.64 MG/DL (ref 0.67–1.17)
CREAT SERPL-MCNC: 0.72 MG/DL (ref 0.67–1.17)
CREAT SERPL-MCNC: 0.72 MG/DL (ref 0.67–1.17)
DIFFERENTIAL METHOD BLD: NORMAL
EOSINOPHIL # BLD: 0.1 THOUSAND/MCL (ref 0.1–0.5)
EOSINOPHIL NFR BLD: 3 %
ERYTHROCYTE [DISTWIDTH] IN BLOOD: 13.8 % (ref 11–15)
GLOBULIN SER-MCNC: 4.3 GM/DL (ref 2–4)
GLUCOSE SERPL-MCNC: 123 MG/DL (ref 65–99)
GLUCOSE SERPL-MCNC: 124 MG/DL (ref 65–99)
GLUCOSE SERPL-MCNC: 149 MG/DL (ref 65–99)
HEMATOCRIT: 41.1 % (ref 39–51)
HGB BLD-MCNC: 14 GM/DL (ref 13–17)
LYMPHOCYTES # BLD: 1 THOUSAND/MCL (ref 1–4)
LYMPHOCYTES NFR BLD: 20 %
MCH RBC QN AUTO: 29.9 PG (ref 26–34)
MCHC RBC AUTO-ENTMCNC: 34.1 GM/DL (ref 32–36.5)
MCV RBC AUTO: 87.8 FL (ref 78–100)
MONOCYTES # BLD: 0.5 THOUSAND/MCL (ref 0.3–0.9)
MONOCYTES NFR BLD: 10 %
NEUTROPHILS # BLD: 3.2 THOUSAND/MCL (ref 1.8–7.7)
NEUTROPHILS NFR BLD: 67 %
NEUTS SEG NFR BLD: NORMAL %
NRBC (NRBCRE): NORMAL
PLATELET # BLD: 209 THOUSAND/MCL (ref 140–450)
POTASSIUM SERPL-SCNC: 4 MMOL/L (ref 3.4–5.1)
POTASSIUM SERPL-SCNC: 4.1 MMOL/L (ref 3.4–5.1)
POTASSIUM SERPL-SCNC: 4.4 MMOL/L (ref 3.4–5.1)
PROT SERPL-MCNC: 7.2 GM/DL (ref 6.4–8.2)
RBC # BLD: 4.68 MILLION/MCL (ref 4.5–5.9)
SODIUM SERPL-SCNC: 135 MMOL/L (ref 135–145)
WBC # BLD: 4.8 THOUSAND/MCL (ref 4.2–11)

## 2018-10-30 LAB
ALBUMIN SERPL-MCNC: 2.7 GM/DL (ref 3.6–5.1)
ALBUMIN/GLOB SERPL: 0.6 {RATIO} (ref 1–2.4)
ALP SERPL-CCNC: 118 UNIT/L (ref 45–117)
ALT SERPL-CCNC: 39 UNIT/L
ANALYZER ANC (IANC): NORMAL
ANION GAP SERPL CALC-SCNC: 11 MMOL/L (ref 10–20)
AST SERPL-CCNC: 51 UNIT/L
BASOPHILS # BLD: 0 THOUSAND/MCL (ref 0–0.3)
BASOPHILS NFR BLD: 1 %
BILIRUB SERPL-MCNC: 1 MG/DL (ref 0.2–1)
BUN SERPL-MCNC: 15 MG/DL (ref 6–20)
BUN/CREAT SERPL: 18 (ref 7–25)
CALCIUM SERPL-MCNC: 8.4 MG/DL (ref 8.4–10.2)
CHLORIDE: 99 MMOL/L (ref 98–107)
CO2 SERPL-SCNC: 30 MMOL/L (ref 21–32)
CREAT SERPL-MCNC: 0.85 MG/DL (ref 0.67–1.17)
DIFFERENTIAL METHOD BLD: NORMAL
EOSINOPHIL # BLD: 0.3 THOUSAND/MCL (ref 0.1–0.5)
EOSINOPHIL NFR BLD: 6 %
ERYTHROCYTE [DISTWIDTH] IN BLOOD: 14 % (ref 11–15)
GLOBULIN SER-MCNC: 4.5 GM/DL (ref 2–4)
GLUCOSE SERPL-MCNC: 102 MG/DL (ref 65–99)
HEMATOCRIT: 41.9 % (ref 39–51)
HGB BLD-MCNC: 14.1 GM/DL (ref 13–17)
LYMPHOCYTES # BLD: 1.5 THOUSAND/MCL (ref 1–4)
LYMPHOCYTES NFR BLD: 33 %
MCH RBC QN AUTO: 30.4 PG (ref 26–34)
MCHC RBC AUTO-ENTMCNC: 33.7 GM/DL (ref 32–36.5)
MCV RBC AUTO: 90.3 FL (ref 78–100)
MONOCYTES # BLD: 0.4 THOUSAND/MCL (ref 0.3–0.9)
MONOCYTES NFR BLD: 10 %
NEUTROPHILS # BLD: 2.2 THOUSAND/MCL (ref 1.8–7.7)
NEUTROPHILS NFR BLD: 50 %
NEUTS SEG NFR BLD: NORMAL %
NRBC (NRBCRE): NORMAL
PLATELET # BLD: 182 THOUSAND/MCL (ref 140–450)
POTASSIUM SERPL-SCNC: 3.6 MMOL/L (ref 3.4–5.1)
PROT SERPL-MCNC: 7.2 GM/DL (ref 6.4–8.2)
RBC # BLD: 4.64 MILLION/MCL (ref 4.5–5.9)
SODIUM SERPL-SCNC: 136 MMOL/L (ref 135–145)
WBC # BLD: 4.4 THOUSAND/MCL (ref 4.2–11)

## 2018-11-06 NOTE — ED NOTES
Pt threatened to leave \"I need something right now\" attempted to get out of bed and security immed contacted.

## 2018-11-06 NOTE — ED NOTES
PT IS VERY MANIPULATIVE WANTING ONLY ATIVAN IVP FOR CIWA SCORE OF 9. COMFORT MEASURES GIVEN AND PT STATES HE WILL GET IN PATIENT HELP.  IN THE PAST PT HAS LEFT BEFORE CHECKING IN AFTER GETTING MULT DOSES OF IV ATIVAN BECAUSE HE FEELS BETTER AND THEN HE HAS

## 2018-11-06 NOTE — BH LEVEL OF CARE ASSESSMENT
Level of Care Assessment Note    General Questions  Why are you here?: \"I can't stop drinking. \"    Precipitating Events: Pt arrives to [facility] stating that he wants help to stop drinking.   He stated that his drinking has affected his daily functioning No  Score - BH OV: 0- Low Risk   Describe : Pt denied current and past SI. Is your experience of thoughts of dying by suicide: (NA )  Protective Factors: Pt denied SI.     Past Suicidal Ideation: Denies  Family History or Personal Lived Experience of [place] Current/Previous MH/CD Providers  Hospitalizations, Placements, Therapy, Detox: Yes        Prior [facility] Inpatient  Name: Carly Rutledge   Dates of Treatment: Multiple admissions to [facility] and [facility] medical floor due to alcohol withdrawal School: No  Employment Status: Retired  Job Issues: (NA)  Concerns/Conflicts with Social Relationships: Yes  Describe Concerns/Conflicts with Social Relationships[de-identified] Children are concerned about pt's drinking.     Decreased Functional Ability: Complete ADLs( situation;Participated    Assessment Summary  Assessment Summary: Pt is a 59year old male who presents to [facility] ED due to alcohol abuse. Pt has an ongoing hx of alcohol dependence and withdrawal.  He reported hx of DTs but denied hx of seizures.   He sta Psychiatric Diagnosis  Substance Related and Addictive Disorders: Alcohol-Related Disorder - Alcohol Withdrawal        Pervasive Diagnoses  Neurodevelopmental Disorders: Deferred  Personality Disorders: Deferred  Pertinent Non-psychiatric Diagnoses: see me

## 2018-11-06 NOTE — ED PROVIDER NOTES
Patient Seen in: [facility] Emergency Department    History   Patient presents with:  Eval-P (psychiatric)  Alcohol Intoxication (neurologic)    Stated Complaint: etoh +  arrived from home req detox  denies injuries/si/hi    HPI    Patient is a 58-y as noted in HPI. Constitutional and vital signs reviewed. All other systems reviewed and negative except as noted above.     Physical Exam     ED Triage Vitals [11/05/18 2121]   BP (!) 142/103   Pulse 100   Resp 19   Temp 97.4 °F (36.3 °C)   Temp src All other components within normal limits   CBC WITH DIFFERENTIAL WITH PLATELET    Narrative: The following orders were created for panel order CBC WITH DIFFERENTIAL WITH PLATELET.   Procedure                               Abnormality         Status

## 2018-11-06 NOTE — ED PROVIDER NOTES
The patient was seen in the department without any additional complaints or agitation noted at this time.

## 2018-11-06 NOTE — ED NOTES
Assumed care, report received from [facility]. Pt resting in bed, no distress noted. Pt c/o feeling anxious. CIWA protocol initiated.  Pt denies pain

## 2018-11-06 NOTE — ED INITIAL ASSESSMENT (HPI)
Pt to er from home via [address] [place] [address]   Pt reports his last drink was 3 hrs ago and \"I need some help\"  Denies SI/HI  States he wants detox

## 2018-11-08 ENCOUNTER — HOSPITAL (OUTPATIENT)
Dept: OTHER | Age: 63
End: 2018-11-08
Attending: INTERNAL MEDICINE

## 2018-11-08 LAB
ALBUMIN SERPL-MCNC: 2.9 GM/DL (ref 3.6–5.1)
ALBUMIN/GLOB SERPL: 0.6 {RATIO} (ref 1–2.4)
ALP SERPL-CCNC: 130 UNIT/L (ref 45–117)
ALT SERPL-CCNC: 38 UNIT/L
ANALYZER ANC (IANC): NORMAL
ANION GAP SERPL CALC-SCNC: 16 MMOL/L (ref 10–20)
AST SERPL-CCNC: 56 UNIT/L
BASOPHILS # BLD: 0 THOUSAND/MCL (ref 0–0.3)
BASOPHILS NFR BLD: 0 %
BILIRUB SERPL-MCNC: 0.6 MG/DL (ref 0.2–1)
BUN SERPL-MCNC: 2 MG/DL (ref 6–20)
BUN/CREAT SERPL: 3 (ref 7–25)
CALCIUM SERPL-MCNC: 8.4 MG/DL (ref 8.4–10.2)
CHLORIDE: 89 MMOL/L (ref 98–107)
CO2 SERPL-SCNC: 26 MMOL/L (ref 21–32)
CREAT SERPL-MCNC: 0.6 MG/DL (ref 0.67–1.17)
DIFFERENTIAL METHOD BLD: NORMAL
EOSINOPHIL # BLD: 0.1 THOUSAND/MCL (ref 0.1–0.5)
EOSINOPHIL NFR BLD: 2 %
ERYTHROCYTE [DISTWIDTH] IN BLOOD: 13.9 % (ref 11–15)
ETHANOL SERPL-MCNC: 390 MG/DL
GLOBULIN SER-MCNC: 5.1 GM/DL (ref 2–4)
GLUCOSE SERPL-MCNC: 150 MG/DL (ref 65–99)
HEMATOCRIT: 39.7 % (ref 39–51)
HGB BLD-MCNC: 14.4 GM/DL (ref 13–17)
LIPASE SERPL-CCNC: 111 UNIT/L (ref 73–393)
LYMPHOCYTES # BLD: 1.5 THOUSAND/MCL (ref 1–4)
LYMPHOCYTES NFR BLD: 20 %
MAGNESIUM SERPL-MCNC: 2.1 MG/DL (ref 1.7–2.4)
MCH RBC QN AUTO: 30.9 PG (ref 26–34)
MCHC RBC AUTO-ENTMCNC: 36.3 GM/DL (ref 32–36.5)
MCV RBC AUTO: 85.2 FL (ref 78–100)
MONOCYTES # BLD: 0.5 THOUSAND/MCL (ref 0.3–0.9)
MONOCYTES NFR BLD: 7 %
NEUTROPHILS # BLD: 5.3 THOUSAND/MCL (ref 1.8–7.7)
NEUTROPHILS NFR BLD: 71 %
NEUTS SEG NFR BLD: NORMAL %
NRBC (NRBCRE): NORMAL
OSMOLALITY SERPL: 353 MOSM/KG (ref 275–300)
PLATELET # BLD: 198 THOUSAND/MCL (ref 140–450)
POTASSIUM SERPL-SCNC: 4.1 MMOL/L (ref 3.4–5.1)
PROT SERPL-MCNC: 8 GM/DL (ref 6.4–8.2)
RBC # BLD: 4.66 MILLION/MCL (ref 4.5–5.9)
SODIUM SERPL-SCNC: 127 MMOL/L (ref 135–145)
WBC # BLD: 7.4 THOUSAND/MCL (ref 4.2–11)

## 2018-11-09 LAB
ALBUMIN SERPL-MCNC: 2.4 GM/DL (ref 3.6–5.1)
ALBUMIN/GLOB SERPL: 0.6 {RATIO} (ref 1–2.4)
ALP SERPL-CCNC: 104 UNIT/L (ref 45–117)
ALT SERPL-CCNC: 27 UNIT/L
ANALYZER ANC (IANC): ABNORMAL
ANION GAP SERPL CALC-SCNC: 16 MMOL/L (ref 10–20)
AST SERPL-CCNC: 40 UNIT/L
BASOPHILS # BLD: 0 THOUSAND/MCL (ref 0–0.3)
BASOPHILS NFR BLD: 0 %
BILIRUB SERPL-MCNC: 0.7 MG/DL (ref 0.2–1)
BUN SERPL-MCNC: 4 MG/DL (ref 6–20)
BUN/CREAT SERPL: 4 (ref 7–25)
CALCIUM SERPL-MCNC: 7.6 MG/DL (ref 8.4–10.2)
CHLORIDE: 98 MMOL/L (ref 98–107)
CO2 SERPL-SCNC: 25 MMOL/L (ref 21–32)
CREAT SERPL-MCNC: 1.05 MG/DL (ref 0.67–1.17)
DIFFERENTIAL METHOD BLD: ABNORMAL
EOSINOPHIL # BLD: 0.1 THOUSAND/MCL (ref 0.1–0.5)
EOSINOPHIL NFR BLD: 3 %
ERYTHROCYTE [DISTWIDTH] IN BLOOD: 14.4 % (ref 11–15)
GLOBULIN SER-MCNC: 4.1 GM/DL (ref 2–4)
GLUCOSE SERPL-MCNC: 104 MG/DL (ref 65–99)
HEMATOCRIT: 35.5 % (ref 39–51)
HGB BLD-MCNC: 12.4 GM/DL (ref 13–17)
LYMPHOCYTES # BLD: 1 THOUSAND/MCL (ref 1–4)
LYMPHOCYTES NFR BLD: 18 %
MCH RBC QN AUTO: 30.4 PG (ref 26–34)
MCHC RBC AUTO-ENTMCNC: 34.9 GM/DL (ref 32–36.5)
MCV RBC AUTO: 87 FL (ref 78–100)
MONOCYTES # BLD: 0.5 THOUSAND/MCL (ref 0.3–0.9)
MONOCYTES NFR BLD: 9 %
NEUTROPHILS # BLD: 3.7 THOUSAND/MCL (ref 1.8–7.7)
NEUTROPHILS NFR BLD: 70 %
NEUTS SEG NFR BLD: ABNORMAL %
NRBC (NRBCRE): ABNORMAL
PLATELET # BLD: 178 THOUSAND/MCL (ref 140–450)
POTASSIUM SERPL-SCNC: 3.6 MMOL/L (ref 3.4–5.1)
PROT SERPL-MCNC: 6.5 GM/DL (ref 6.4–8.2)
RBC # BLD: 4.08 MILLION/MCL (ref 4.5–5.9)
SODIUM SERPL-SCNC: 135 MMOL/L (ref 135–145)
WBC # BLD: 5.3 THOUSAND/MCL (ref 4.2–11)

## 2018-11-10 ENCOUNTER — CHARTING TRANS (OUTPATIENT)
Dept: OTHER | Age: 63
End: 2018-11-10

## 2018-11-11 ENCOUNTER — HOSPITAL (OUTPATIENT)
Dept: OTHER | Age: 63
End: 2018-11-11
Attending: INTERNAL MEDICINE

## 2018-11-11 ENCOUNTER — DIAGNOSTIC TRANS (OUTPATIENT)
Dept: OTHER | Age: 63
End: 2018-11-11

## 2018-11-11 LAB
ALBUMIN SERPL-MCNC: 2.9 GM/DL (ref 3.6–5.1)
ALBUMIN/GLOB SERPL: 0.7 {RATIO} (ref 1–2.4)
ALP SERPL-CCNC: 115 UNIT/L (ref 45–117)
ALT SERPL-CCNC: 38 UNIT/L
ANALYZER ANC (IANC): ABNORMAL
ANION GAP SERPL CALC-SCNC: 17 MMOL/L (ref 10–20)
APTT PPP: 30 SECONDS (ref 22–32)
APTT PPP: NORMAL S
AST SERPL-CCNC: 56 UNIT/L
BASOPHILS # BLD: 0 THOUSAND/MCL (ref 0–0.3)
BASOPHILS NFR BLD: 0 %
BILIRUB SERPL-MCNC: 0.7 MG/DL (ref 0.2–1)
BUN SERPL-MCNC: 4 MG/DL (ref 6–20)
BUN/CREAT SERPL: 7 (ref 7–25)
CALCIUM SERPL-MCNC: 8 MG/DL (ref 8.4–10.2)
CHLORIDE: 89 MMOL/L (ref 98–107)
CK SERPL-CCNC: 675 UNIT/L (ref 39–308)
CO2 SERPL-SCNC: 24 MMOL/L (ref 21–32)
CREAT SERPL-MCNC: 0.59 MG/DL (ref 0.67–1.17)
DIFFERENTIAL METHOD BLD: ABNORMAL
EOSINOPHIL # BLD: 0.1 THOUSAND/MCL (ref 0.1–0.5)
EOSINOPHIL NFR BLD: 1 %
ERYTHROCYTE [DISTWIDTH] IN BLOOD: 14.1 % (ref 11–15)
ETHANOL SERPL-MCNC: 161 MG/DL
GLOBULIN SER-MCNC: 4.3 GM/DL (ref 2–4)
GLUCOSE SERPL-MCNC: 137 MG/DL (ref 65–99)
HEMATOCRIT: 33.8 % (ref 39–51)
HGB BLD-MCNC: 12 GM/DL (ref 13–17)
INR PPP: 1
LIPASE SERPL-CCNC: 131 UNIT/L (ref 73–393)
LYMPHOCYTES # BLD: 1.4 THOUSAND/MCL (ref 1–4)
LYMPHOCYTES NFR BLD: 18 %
MAGNESIUM SERPL-MCNC: 1.6 MG/DL (ref 1.7–2.4)
MCH RBC QN AUTO: 30.8 PG (ref 26–34)
MCHC RBC AUTO-ENTMCNC: 35.5 GM/DL (ref 32–36.5)
MCV RBC AUTO: 86.7 FL (ref 78–100)
MONOCYTES # BLD: 0.7 THOUSAND/MCL (ref 0.3–0.9)
MONOCYTES NFR BLD: 9 %
NEUTROPHILS # BLD: 5.4 THOUSAND/MCL (ref 1.8–7.7)
NEUTROPHILS NFR BLD: 72 %
NEUTS SEG NFR BLD: ABNORMAL %
NRBC (NRBCRE): ABNORMAL
PLATELET # BLD: 226 THOUSAND/MCL (ref 140–450)
POTASSIUM SERPL-SCNC: 4 MMOL/L (ref 3.4–5.1)
PROT SERPL-MCNC: 7.2 GM/DL (ref 6.4–8.2)
PROTHROMBIN TIME: 10.5 SECONDS (ref 9.7–11.8)
PROTHROMBIN TIME: NORMAL
RBC # BLD: 3.9 MILLION/MCL (ref 4.5–5.9)
SODIUM SERPL-SCNC: 126 MMOL/L (ref 135–145)
TROPONIN I SERPL HS-MCNC: <0.02 NG/ML
WBC # BLD: 7.6 THOUSAND/MCL (ref 4.2–11)

## 2018-11-12 ENCOUNTER — CHARTING TRANS (OUTPATIENT)
Dept: OTHER | Age: 63
End: 2018-11-12

## 2018-11-12 LAB
ALBUMIN SERPL-MCNC: 2.6 GM/DL (ref 3.6–5.1)
ALBUMIN/GLOB SERPL: 0.7 {RATIO} (ref 1–2.4)
ALP SERPL-CCNC: 111 UNIT/L (ref 45–117)
ALT SERPL-CCNC: 33 UNIT/L
AMPHETAMINES UR QL SCN>500 NG/ML: NEGATIVE
ANALYZER ANC (IANC): ABNORMAL
ANION GAP SERPL CALC-SCNC: 12 MMOL/L (ref 10–20)
AST SERPL-CCNC: 42 UNIT/L
BARBITURATES UR QL SCN>200 NG/ML: NEGATIVE
BASOPHILS # BLD: 0 THOUSAND/MCL (ref 0–0.3)
BASOPHILS NFR BLD: 0 %
BENZODIAZ UR QL SCN>200 NG/ML: NEGATIVE
BILIRUB SERPL-MCNC: 0.8 MG/DL (ref 0.2–1)
BUN SERPL-MCNC: 4 MG/DL (ref 6–20)
BUN/CREAT SERPL: 6 (ref 7–25)
BZE UR QL SCN>150 NG/ML: NEGATIVE
CALCIUM SERPL-MCNC: 7.9 MG/DL (ref 8.4–10.2)
CANNABINOIDS UR QL SCN>50 NG/ML: NEGATIVE
CHLORIDE: 96 MMOL/L (ref 98–107)
CO2 SERPL-SCNC: 28 MMOL/L (ref 21–32)
CREAT SERPL-MCNC: 0.68 MG/DL (ref 0.67–1.17)
DIFFERENTIAL METHOD BLD: ABNORMAL
EOSINOPHIL # BLD: 0.1 THOUSAND/MCL (ref 0.1–0.5)
EOSINOPHIL NFR BLD: 1 %
ERYTHROCYTE [DISTWIDTH] IN BLOOD: 14.6 % (ref 11–15)
GLOBULIN SER-MCNC: 4 GM/DL (ref 2–4)
GLUCOSE SERPL-MCNC: 124 MG/DL (ref 65–99)
HEMATOCRIT: 31.4 % (ref 39–51)
HGB BLD-MCNC: 11 GM/DL (ref 13–17)
LYMPHOCYTES # BLD: 0.7 THOUSAND/MCL (ref 1–4)
LYMPHOCYTES NFR BLD: 16 %
MAGNESIUM SERPL-MCNC: 2.1 MG/DL (ref 1.7–2.4)
MCH RBC QN AUTO: 30.9 PG (ref 26–34)
MCHC RBC AUTO-ENTMCNC: 35 GM/DL (ref 32–36.5)
MCV RBC AUTO: 88.2 FL (ref 78–100)
MONOCYTES # BLD: 0.7 THOUSAND/MCL (ref 0.3–0.9)
MONOCYTES NFR BLD: 16 %
NEUTROPHILS # BLD: 3.1 THOUSAND/MCL (ref 1.8–7.7)
NEUTROPHILS NFR BLD: 67 %
NEUTS SEG NFR BLD: ABNORMAL %
NRBC (NRBCRE): ABNORMAL
OPIATES UR QL SCN>300 NG/ML: NEGATIVE
PCP UR QL SCN>25 NG/ML: NEGATIVE
PLATELET # BLD: 213 THOUSAND/MCL (ref 140–450)
POTASSIUM SERPL-SCNC: 4 MMOL/L (ref 3.4–5.1)
PROT SERPL-MCNC: 6.6 GM/DL (ref 6.4–8.2)
RBC # BLD: 3.56 MILLION/MCL (ref 4.5–5.9)
SODIUM SERPL-SCNC: 132 MMOL/L (ref 135–145)
WBC # BLD: 4.6 THOUSAND/MCL (ref 4.2–11)

## 2018-11-13 LAB
ANALYZER ANC (IANC): ABNORMAL
ANION GAP SERPL CALC-SCNC: 8 MMOL/L (ref 10–20)
BUN SERPL-MCNC: 8 MG/DL (ref 6–20)
BUN/CREAT SERPL: 10 (ref 7–25)
CALCIUM SERPL-MCNC: 8.3 MG/DL (ref 8.4–10.2)
CHLORIDE: 102 MMOL/L (ref 98–107)
CO2 SERPL-SCNC: 29 MMOL/L (ref 21–32)
CREAT SERPL-MCNC: 0.84 MG/DL (ref 0.67–1.17)
ERYTHROCYTE [DISTWIDTH] IN BLOOD: 15.1 % (ref 11–15)
GLUCOSE SERPL-MCNC: 124 MG/DL (ref 65–99)
HEMATOCRIT: 32.8 % (ref 39–51)
HGB BLD-MCNC: 10.9 GM/DL (ref 13–17)
MCH RBC QN AUTO: 30.9 PG (ref 26–34)
MCHC RBC AUTO-ENTMCNC: 33.2 GM/DL (ref 32–36.5)
MCV RBC AUTO: 92.9 FL (ref 78–100)
NRBC (NRBCRE): ABNORMAL
PLATELET # BLD: 237 THOUSAND/MCL (ref 140–450)
POTASSIUM SERPL-SCNC: 3.8 MMOL/L (ref 3.4–5.1)
RBC # BLD: 3.53 MILLION/MCL (ref 4.5–5.9)
SODIUM SERPL-SCNC: 135 MMOL/L (ref 135–145)
WBC # BLD: 4.7 THOUSAND/MCL (ref 4.2–11)

## 2018-11-14 LAB
ANALYZER ANC (IANC): ABNORMAL
ANION GAP SERPL CALC-SCNC: 17 MMOL/L (ref 10–20)
BUN SERPL-MCNC: 11 MG/DL (ref 6–20)
BUN/CREAT SERPL: 13 (ref 7–25)
CALCIUM SERPL-MCNC: 8.4 MG/DL (ref 8.4–10.2)
CHLORIDE: 99 MMOL/L (ref 98–107)
CO2 SERPL-SCNC: 28 MMOL/L (ref 21–32)
CREAT SERPL-MCNC: 0.83 MG/DL (ref 0.67–1.17)
ERYTHROCYTE [DISTWIDTH] IN BLOOD: 15.1 % (ref 11–15)
GLUCOSE SERPL-MCNC: 151 MG/DL (ref 65–99)
HEMATOCRIT: 32.5 % (ref 39–51)
HGB BLD-MCNC: 10.8 GM/DL (ref 13–17)
MCH RBC QN AUTO: 30.8 PG (ref 26–34)
MCHC RBC AUTO-ENTMCNC: 33.2 GM/DL (ref 32–36.5)
MCV RBC AUTO: 92.6 FL (ref 78–100)
NRBC (NRBCRE): ABNORMAL
PLATELET # BLD: 250 THOUSAND/MCL (ref 140–450)
POTASSIUM SERPL-SCNC: 3.7 MMOL/L (ref 3.4–5.1)
RBC # BLD: 3.51 MILLION/MCL (ref 4.5–5.9)
SODIUM SERPL-SCNC: 140 MMOL/L (ref 135–145)
WBC # BLD: 4.9 THOUSAND/MCL (ref 4.2–11)

## 2018-11-20 ENCOUNTER — DIAGNOSTIC TRANS (OUTPATIENT)
Dept: OTHER | Age: 63
End: 2018-11-20

## 2018-11-20 ENCOUNTER — HOSPITAL (OUTPATIENT)
Dept: OTHER | Age: 63
End: 2018-11-20
Attending: INTERNAL MEDICINE

## 2018-11-20 LAB
ALBUMIN SERPL-MCNC: 3.1 GM/DL (ref 3.6–5.1)
ALBUMIN/GLOB SERPL: 0.7 {RATIO} (ref 1–2.4)
ALP SERPL-CCNC: 105 UNIT/L (ref 45–117)
ALT SERPL-CCNC: 52 UNIT/L
AMPHETAMINES UR QL SCN>500 NG/ML: NEGATIVE
ANALYZER ANC (IANC): ABNORMAL
ANION GAP SERPL CALC-SCNC: 20 MMOL/L (ref 10–20)
AST SERPL-CCNC: 52 UNIT/L
BARBITURATES UR QL SCN>200 NG/ML: NEGATIVE
BASOPHILS # BLD: 0.1 THOUSAND/MCL (ref 0–0.3)
BASOPHILS NFR BLD: 1 %
BENZODIAZ UR QL SCN>200 NG/ML: NEGATIVE
BILIRUB SERPL-MCNC: 0.5 MG/DL (ref 0.2–1)
BUN SERPL-MCNC: 3 MG/DL (ref 6–20)
BUN/CREAT SERPL: 4 (ref 7–25)
BZE UR QL SCN>150 NG/ML: NEGATIVE
CALCIUM SERPL-MCNC: 8.7 MG/DL (ref 8.4–10.2)
CANNABINOIDS UR QL SCN>50 NG/ML: NEGATIVE
CHLORIDE: 102 MMOL/L (ref 98–107)
CO2 SERPL-SCNC: 24 MMOL/L (ref 21–32)
CREAT SERPL-MCNC: 0.68 MG/DL (ref 0.67–1.17)
DIFFERENTIAL METHOD BLD: ABNORMAL
EOSINOPHIL # BLD: 0.1 THOUSAND/MCL (ref 0.1–0.5)
EOSINOPHIL NFR BLD: 1 %
ERYTHROCYTE [DISTWIDTH] IN BLOOD: 16 % (ref 11–15)
ETHANOL SERPL-MCNC: 331 MG/DL
GLOBULIN SER-MCNC: 4.6 GM/DL (ref 2–4)
GLUCOSE SERPL-MCNC: 109 MG/DL (ref 65–99)
HEMATOCRIT: 40.8 % (ref 39–51)
HGB BLD-MCNC: 13.8 GM/DL (ref 13–17)
LYMPHOCYTES # BLD: 1.5 THOUSAND/MCL (ref 1–4)
LYMPHOCYTES NFR BLD: 33 %
MAGNESIUM SERPL-MCNC: 1.9 MG/DL (ref 1.7–2.4)
MCH RBC QN AUTO: 31.4 PG (ref 26–34)
MCHC RBC AUTO-ENTMCNC: 33.8 GM/DL (ref 32–36.5)
MCV RBC AUTO: 92.7 FL (ref 78–100)
MONOCYTES # BLD: 0.9 THOUSAND/MCL (ref 0.3–0.9)
MONOCYTES NFR BLD: 19 %
NEUTROPHILS # BLD: 2.1 THOUSAND/MCL (ref 1.8–7.7)
NEUTROPHILS NFR BLD: 46 %
NEUTS SEG NFR BLD: ABNORMAL %
NRBC (NRBCRE): ABNORMAL
OPIATES UR QL SCN>300 NG/ML: NEGATIVE
PCP UR QL SCN>25 NG/ML: NEGATIVE
PHOSPHATE SERPL-MCNC: 3.8 MG/DL (ref 2.4–4.7)
PLATELET # BLD: 397 THOUSAND/MCL (ref 140–450)
POTASSIUM SERPL-SCNC: 3.9 MMOL/L (ref 3.4–5.1)
PROT SERPL-MCNC: 7.7 GM/DL (ref 6.4–8.2)
RBC # BLD: 4.4 MILLION/MCL (ref 4.5–5.9)
SODIUM SERPL-SCNC: 142 MMOL/L (ref 135–145)
WBC # BLD: 4.6 THOUSAND/MCL (ref 4.2–11)

## 2018-11-21 ENCOUNTER — CHARTING TRANS (OUTPATIENT)
Dept: OTHER | Age: 63
End: 2018-11-21

## 2018-11-21 LAB
ALBUMIN SERPL-MCNC: 2.8 GM/DL (ref 3.6–5.1)
ALBUMIN/GLOB SERPL: 0.7 {RATIO} (ref 1–2.4)
ALP SERPL-CCNC: 100 UNIT/L (ref 45–117)
ALT SERPL-CCNC: 45 UNIT/L
ANALYZER ANC (IANC): ABNORMAL
ANION GAP SERPL CALC-SCNC: 14 MMOL/L (ref 10–20)
AST SERPL-CCNC: 40 UNIT/L
BASOPHILS # BLD: 0 THOUSAND/MCL (ref 0–0.3)
BASOPHILS NFR BLD: 1 %
BILIRUB SERPL-MCNC: 1.2 MG/DL (ref 0.2–1)
BUN SERPL-MCNC: 9 MG/DL (ref 6–20)
BUN/CREAT SERPL: 10 (ref 7–25)
CALCIUM SERPL-MCNC: 8.4 MG/DL (ref 8.4–10.2)
CHLORIDE: 103 MMOL/L (ref 98–107)
CO2 SERPL-SCNC: 26 MMOL/L (ref 21–32)
CREAT SERPL-MCNC: 0.87 MG/DL (ref 0.67–1.17)
DIFFERENTIAL METHOD BLD: ABNORMAL
EOSINOPHIL # BLD: 0.1 THOUSAND/MCL (ref 0.1–0.5)
EOSINOPHIL NFR BLD: 1 %
ERYTHROCYTE [DISTWIDTH] IN BLOOD: 16.6 % (ref 11–15)
GLOBULIN SER-MCNC: 4.1 GM/DL (ref 2–4)
GLUCOSE SERPL-MCNC: 112 MG/DL (ref 65–99)
HEMATOCRIT: 37.7 % (ref 39–51)
HGB BLD-MCNC: 12.2 GM/DL (ref 13–17)
LYMPHOCYTES # BLD: 1.4 THOUSAND/MCL (ref 1–4)
LYMPHOCYTES NFR BLD: 26 %
MCH RBC QN AUTO: 30.3 PG (ref 26–34)
MCHC RBC AUTO-ENTMCNC: 32.4 GM/DL (ref 32–36.5)
MCV RBC AUTO: 93.5 FL (ref 78–100)
MONOCYTES # BLD: 0.8 THOUSAND/MCL (ref 0.3–0.9)
MONOCYTES NFR BLD: 15 %
NEUTROPHILS # BLD: 3.1 THOUSAND/MCL (ref 1.8–7.7)
NEUTROPHILS NFR BLD: 57 %
NEUTS SEG NFR BLD: ABNORMAL %
NRBC (NRBCRE): ABNORMAL
PLATELET # BLD: 362 THOUSAND/MCL (ref 140–450)
POTASSIUM SERPL-SCNC: 3.9 MMOL/L (ref 3.4–5.1)
PROT SERPL-MCNC: 6.9 GM/DL (ref 6.4–8.2)
RBC # BLD: 4.03 MILLION/MCL (ref 4.5–5.9)
SODIUM SERPL-SCNC: 139 MMOL/L (ref 135–145)
WBC # BLD: 5.3 THOUSAND/MCL (ref 4.2–11)

## 2018-12-08 ENCOUNTER — CHARTING TRANS (OUTPATIENT)
Dept: OTHER | Age: 63
End: 2018-12-08

## 2018-12-08 ENCOUNTER — HOSPITAL (OUTPATIENT)
Dept: OTHER | Age: 63
End: 2018-12-08
Attending: INTERNAL MEDICINE

## 2018-12-08 LAB
ALBUMIN SERPL-MCNC: 3.2 GM/DL (ref 3.6–5.1)
ALP SERPL-CCNC: 89 UNIT/L (ref 45–117)
ALT SERPL-CCNC: 38 UNIT/L
AMORPH SED URNS QL MICRO: ABNORMAL
AMPHETAMINES UR QL SCN>500 NG/ML: NEGATIVE
ANALYZER ANC (IANC): ABNORMAL
ANION GAP SERPL CALC-SCNC: 17 MMOL/L (ref 10–20)
APPEARANCE UR: CLEAR
APTT PPP: 28 SECONDS (ref 22–32)
APTT PPP: NORMAL S
AST SERPL-CCNC: 37 UNIT/L
B-OH-BUTYR SERPL-SCNC: 0.2 MMOL/L (ref 0–0.3)
BACTERIA #/AREA URNS HPF: ABNORMAL /HPF
BARBITURATES UR QL SCN>200 NG/ML: NEGATIVE
BASOPHILS # BLD: 0 THOUSAND/MCL (ref 0–0.3)
BASOPHILS NFR BLD: 1 %
BENZODIAZ UR QL SCN>200 NG/ML: NEGATIVE
BILIRUB CONJ SERPL-MCNC: 0.1 MG/DL (ref 0–0.2)
BILIRUB SERPL-MCNC: 0.4 MG/DL (ref 0.2–1)
BILIRUB UR QL: NEGATIVE
BUN SERPL-MCNC: 5 MG/DL (ref 6–20)
BUN/CREAT SERPL: 8 (ref 7–25)
BZE UR QL SCN>150 NG/ML: NEGATIVE
CALCIUM SERPL-MCNC: 8.3 MG/DL (ref 8.4–10.2)
CANNABINOIDS UR QL SCN>50 NG/ML: NEGATIVE
CAOX CRY URNS QL MICRO: ABNORMAL
CHLORIDE: 93 MMOL/L (ref 98–107)
CO2 SERPL-SCNC: 23 MMOL/L (ref 21–32)
COLOR UR: ABNORMAL
CREAT SERPL-MCNC: 0.66 MG/DL (ref 0.67–1.17)
DIFFERENTIAL METHOD BLD: ABNORMAL
EOSINOPHIL # BLD: 0 THOUSAND/MCL (ref 0.1–0.5)
EOSINOPHIL NFR BLD: 0 %
EPITH CASTS #/AREA URNS LPF: ABNORMAL /[LPF]
ERYTHROCYTE [DISTWIDTH] IN BLOOD: 14.2 % (ref 11–15)
ETHANOL SERPL-MCNC: 354 MG/DL
FATTY CASTS #/AREA URNS LPF: ABNORMAL /[LPF]
GLUCOSE SERPL-MCNC: 192 MG/DL (ref 65–99)
GLUCOSE UR-MCNC: NEGATIVE MG/DL
GRAN CASTS #/AREA URNS LPF: ABNORMAL /[LPF]
HEMATOCRIT: 41.4 % (ref 39–51)
HGB BLD-MCNC: 14.1 GM/DL (ref 13–17)
HGB UR QL: NEGATIVE
HYALINE CASTS #/AREA URNS LPF: ABNORMAL /LPF (ref 0–5)
IMM GRANULOCYTES # BLD AUTO: 0 THOUSAND/MCL (ref 0–0.2)
IMM GRANULOCYTES NFR BLD: 0 %
INR PPP: 1.1
KETONES UR-MCNC: NEGATIVE MG/DL
LEUKOCYTE ESTERASE UR QL STRIP: ABNORMAL
LIPASE SERPL-CCNC: 94 UNIT/L (ref 73–393)
LYMPHOCYTES # BLD: 1.7 THOUSAND/MCL (ref 1–4)
LYMPHOCYTES NFR BLD: 28 %
MAGNESIUM SERPL-MCNC: 1.9 MG/DL (ref 1.7–2.4)
MCH RBC QN AUTO: 30.7 PG (ref 26–34)
MCHC RBC AUTO-ENTMCNC: 34.1 GM/DL (ref 32–36.5)
MCV RBC AUTO: 90 FL (ref 78–100)
MIXED CELL CASTS #/AREA URNS LPF: ABNORMAL /[LPF]
MONOCYTES # BLD: 0.6 THOUSAND/MCL (ref 0.3–0.9)
MONOCYTES NFR BLD: 9 %
MUCOUS THREADS URNS QL MICRO: ABNORMAL
NEUTROPHILS # BLD: 3.9 THOUSAND/MCL (ref 1.8–7.7)
NEUTROPHILS NFR BLD: 62 %
NEUTS SEG NFR BLD: ABNORMAL %
NITRITE UR QL: NEGATIVE
NRBC (NRBCRE): 0 /100 WBC
OPIATES UR QL SCN>300 NG/ML: NEGATIVE
PCP UR QL SCN>25 NG/ML: NEGATIVE
PH UR: 6 UNIT (ref 5–7)
PLATELET # BLD: 329 THOUSAND/MCL (ref 140–450)
POTASSIUM SERPL-SCNC: 4 MMOL/L (ref 3.4–5.1)
PROT SERPL-MCNC: 7.9 GM/DL (ref 6.4–8.2)
PROT UR QL: NEGATIVE MG/DL
PROTHROMBIN TIME: 10.8 SECONDS (ref 9.7–11.8)
PROTHROMBIN TIME: NORMAL
RBC # BLD: 4.6 MILLION/MCL (ref 4.5–5.9)
RBC #/AREA URNS HPF: ABNORMAL /HPF (ref 0–3)
RBC CASTS #/AREA URNS LPF: ABNORMAL /[LPF]
RENAL EPI CELLS #/AREA URNS HPF: ABNORMAL /[HPF]
SODIUM SERPL-SCNC: 129 MMOL/L (ref 135–145)
SP GR UR: <1.005 (ref 1–1.03)
SPECIMEN SOURCE: ABNORMAL
SPERM URNS QL MICRO: ABNORMAL
SQUAMOUS #/AREA URNS HPF: ABNORMAL /HPF (ref 0–5)
T VAGINALIS URNS QL MICRO: ABNORMAL
TRI-PHOS CRY URNS QL MICRO: ABNORMAL
URATE CRY URNS QL MICRO: ABNORMAL
URINE REFLEX: ABNORMAL
URNS CMNT MICRO: ABNORMAL
UROBILINOGEN UR QL: 0.2 MG/DL (ref 0–1)
WAXY CASTS #/AREA URNS LPF: ABNORMAL /[LPF]
WBC # BLD: 6.3 THOUSAND/MCL (ref 4.2–11)
WBC #/AREA URNS HPF: ABNORMAL /HPF (ref 0–5)
WBC CASTS #/AREA URNS LPF: ABNORMAL /[LPF]
YEAST HYPHAE URNS QL MICRO: ABNORMAL
YEAST URNS QL MICRO: ABNORMAL

## 2018-12-09 ENCOUNTER — DIAGNOSTIC TRANS (OUTPATIENT)
Dept: OTHER | Age: 63
End: 2018-12-09

## 2018-12-09 LAB
ALBUMIN SERPL-MCNC: 3 GM/DL (ref 3.6–5.1)
ALBUMIN/GLOB SERPL: 0.7 {RATIO} (ref 1–2.4)
ALP SERPL-CCNC: 87 UNIT/L (ref 45–117)
ALT SERPL-CCNC: 32 UNIT/L
ANALYZER ANC (IANC): NORMAL
ANION GAP SERPL CALC-SCNC: 14 MMOL/L (ref 10–20)
AST SERPL-CCNC: 31 UNIT/L
BASOPHILS # BLD: 0.1 THOUSAND/MCL (ref 0–0.3)
BASOPHILS NFR BLD: 1 %
BILIRUB SERPL-MCNC: 1 MG/DL (ref 0.2–1)
BUN SERPL-MCNC: 8 MG/DL (ref 6–20)
BUN/CREAT SERPL: 12 (ref 7–25)
CALCIUM SERPL-MCNC: 8.4 MG/DL (ref 8.4–10.2)
CHLORIDE: 101 MMOL/L (ref 98–107)
CO2 SERPL-SCNC: 23 MMOL/L (ref 21–32)
CREAT SERPL-MCNC: 0.68 MG/DL (ref 0.67–1.17)
DIFFERENTIAL METHOD BLD: NORMAL
EOSINOPHIL # BLD: 0.1 THOUSAND/MCL (ref 0.1–0.5)
EOSINOPHIL NFR BLD: 1 %
ERYTHROCYTE [DISTWIDTH] IN BLOOD: 14.6 % (ref 11–15)
GLOBULIN SER-MCNC: 4.3 GM/DL (ref 2–4)
GLUCOSE BLDC GLUCOMTR-MCNC: 111 MG/DL (ref 65–99)
GLUCOSE BLDC GLUCOMTR-MCNC: 116 MG/DL (ref 65–99)
GLUCOSE SERPL-MCNC: 124 MG/DL (ref 65–99)
GLYCOHEMOGLOBIN: 5.7 % (ref 4.5–5.6)
HEMATOCRIT: 42.1 % (ref 39–51)
HGB BLD-MCNC: 14.1 GM/DL (ref 13–17)
IMM GRANULOCYTES # BLD AUTO: 0 THOUSAND/MCL (ref 0–0.2)
IMM GRANULOCYTES NFR BLD: 0 %
LYMPHOCYTES # BLD: 1.2 THOUSAND/MCL (ref 1–4)
LYMPHOCYTES NFR BLD: 16 %
MCH RBC QN AUTO: 30.6 PG (ref 26–34)
MCHC RBC AUTO-ENTMCNC: 33.5 GM/DL (ref 32–36.5)
MCV RBC AUTO: 91.3 FL (ref 78–100)
MONOCYTES # BLD: 0.6 THOUSAND/MCL (ref 0.3–0.9)
MONOCYTES NFR BLD: 8 %
NEUTROPHILS # BLD: 5.4 THOUSAND/MCL (ref 1.8–7.7)
NEUTROPHILS NFR BLD: 74 %
NEUTS SEG NFR BLD: NORMAL %
NRBC (NRBCRE): 0 /100 WBC
PLATELET # BLD: 286 THOUSAND/MCL (ref 140–450)
POTASSIUM SERPL-SCNC: 4.3 MMOL/L (ref 3.4–5.1)
PROT SERPL-MCNC: 7.3 GM/DL (ref 6.4–8.2)
RBC # BLD: 4.61 MILLION/MCL (ref 4.5–5.9)
SODIUM SERPL-SCNC: 134 MMOL/L (ref 135–145)
WBC # BLD: 7.3 THOUSAND/MCL (ref 4.2–11)

## 2019-01-23 ENCOUNTER — APPOINTMENT (OUTPATIENT)
Dept: ULTRASOUND IMAGING | Facility: HOSPITAL | Age: 64
DRG: 894 | End: 2019-01-23
Attending: INTERNAL MEDICINE
Payer: COMMERCIAL

## 2019-01-23 ENCOUNTER — APPOINTMENT (OUTPATIENT)
Dept: GENERAL RADIOLOGY | Facility: HOSPITAL | Age: 64
DRG: 894 | End: 2019-01-23
Attending: INTERNAL MEDICINE
Payer: COMMERCIAL

## 2019-01-23 PROBLEM — F10.230 ALCOHOL WITHDRAWAL SYNDROME WITHOUT COMPLICATION (HCC): Status: ACTIVE | Noted: 2019-01-23

## 2019-01-23 PROCEDURE — 93970 EXTREMITY STUDY: CPT | Performed by: INTERNAL MEDICINE

## 2019-01-23 PROCEDURE — 71045 X-RAY EXAM CHEST 1 VIEW: CPT | Performed by: INTERNAL MEDICINE

## 2019-01-23 NOTE — ED INITIAL ASSESSMENT (HPI)
Per EMS, patient complaining of help with his drinking and afraid of alcohol withdrawal. Patient states he drinks once a week, usually a 12 pack. Patient reports he started drinking at 1 pm today \"or maybe earlier. He reports drinking 12 beers today.  Ca

## 2019-01-23 NOTE — H&P
[facility]  History and Physical     Italo Carlos Patient Status:  Emergency    3/3/1955 MRN [id number]   Location [address] [facility] Attending Eduardo Ramon MD   Hosp Day # 0 PCP Mira Vu MD     Chief Compl • Heart Disorder Father    • Hypertension Mother        Allergies: No Known Allergies    Medications:    No current facility-administered medications on file prior to encounter.    Current Outpatient Medications on File Prior to Encounter:  Alfuzosin HCl neurological deficits. CNII-XII grossly intact. Musculoskeletal: Moves all extremities. Extremities: No edema or cyanosis. Integument: No rashes or lesions. Psychiatric: Appropriate mood and affect.       Diagnostic Data:      Labs:  Recent Labs   Lab

## 2019-01-23 NOTE — PLAN OF CARE
CARDIOVASCULAR - ADULT    • Maintains optimal cardiac output and hemodynamic stability Progressing    • Absence of cardiac arrhythmias or at baseline Progressing        METABOLIC/FLUID AND ELECTROLYTES - ADULT    • Electrolytes maintained within normal holder

## 2019-01-23 NOTE — ED PROVIDER NOTES
Patient Seen in: [facility] Emergency Department    History   Patient presents with:  Alcohol Intoxication (neurologic)    Stated Complaint: etoh    HPI    81-year-old male with history of alcohol abuse presents reporting alcohol withdrawal.  His la except as noted above.     Physical Exam     ED Triage Vitals [01/22/19 2036]   /88   Pulse (!) 125   Resp 17   Temp 98.4 °F (36.9 °C)   Temp src Temporal   SpO2 94 %   O2 Device None (Room air)       Current:BP (!) 143/94   Pulse 119   Temp 98.4 °F ( Screen should be used only for medical purposes. CBC WITH DIFFERENTIAL WITH PLATELET    Narrative: The following orders were created for panel order CBC WITH DIFFERENTIAL WITH PLATELET.   Procedure                               Abnormality         Sta Discharge Medication List        Present on Admission  Date Reviewed: 9/24/2018          ICD-10-CM Noted POA    Alcohol abuse F10.10 9/13/2018 Unknown

## 2019-01-23 NOTE — ED NOTES
Patient was yelling from room for a nurse I went in there he demanded an IV that second because he wanted his medication now.  I told him a nurse was coming and he proceeded to call me a bit

## 2019-01-23 NOTE — CONSULTS
Pulmonary / Critical Care H&P/Consult       NAME: Gayla Shukla - ROOM: 005/063-H - MRN: [id number] - Age: 59year old - :  3/3/1955    Date of Admission: 2019  8:33 PM  Admission Diagnosis: Alcohol abuse [F10.10]  Alcohol withdrawal syndrome without children: Not on file      Years of education: Not on file      Highest education level: Not on file    Social Needs      Financial resource strain: Not on file      Food insecurity - worry: Not on file      Food insecurity - inability: Not on file      Tr LORazepam, LORazepam, LORazepam     REVIEW OF SYSTEMS:   Unable to obtain from patient    OBJECTIVE:   01/23/19  0600 01/23/19  0756 01/23/19  0921 01/23/19  0923   BP: (!) 141/101 (!) 149/96 153/79    BP Location:       Pulse: (!) 127 120 116    Resp: 102   CO2  21.0*  21.0*   BUN  15   --      CREATININE (mg/dL)   Date Value   07/11/2013 0.59 (L)   06/21/2013 1.01     Creatinine (mg/dL)   Date Value   01/22/2019 0.82   11/05/2018 0.76   09/15/2018 0.68 (L)   ]    Recent Labs   Lab  01/22/19   2158   A

## 2019-01-23 NOTE — PROGRESS NOTES
Patient CIWA score 12 for anxiety, HA, and tremor at 0800, given 1 mg IVP ativan. Patient sleeping when reassesed. score was 15 when reassessed by [id number] Marcelo Dalton RN at [phone] and 11 at [facility]. hospitalist notified, transfer to ICU per [facility] protocol.

## 2019-01-23 NOTE — ED NOTES
Report given to [address] [place] [address] Drive, RN. Patient updated with plan of care, no questions at this time.

## 2019-01-23 NOTE — PLAN OF CARE
DRUG ABUSE/DETOX    • Will have no detox symptoms and will verbalize plan for changing drug-related behavior Not Progressing          Patient received as transfer from medical floor due to sustained elevated CIWA.  PRN IV Ativan dosed per high frequency pro

## 2019-01-24 ENCOUNTER — APPOINTMENT (OUTPATIENT)
Dept: GENERAL RADIOLOGY | Facility: HOSPITAL | Age: 64
DRG: 894 | End: 2019-01-24
Attending: NURSE PRACTITIONER
Payer: COMMERCIAL

## 2019-01-24 PROCEDURE — 71045 X-RAY EXAM CHEST 1 VIEW: CPT | Performed by: NURSE PRACTITIONER

## 2019-01-24 NOTE — PROGRESS NOTES
Bakari Morales Patient Status:  Inpatient    3/3/1955 MRN [id number]   [facility] 4SW-A Attending Chapis Garcia MD   Hosp Day # 1 PCP [address], MD     Critical Care Progress Note      Assessment/Plan:  1. etoh abuse / with BMI 34.44 kg/m²   Physical Exam:   General: alert, cooperative   HEENT: lips, mucosa, tongue normal. Mallampati II   Lungs: Clear to auscultation bilaterally, no focal wheezes or crackles    Chest wall: No tenderness or deformity.    Heart: Regular rate and

## 2019-01-24 NOTE — PROGRESS NOTES
[facility] HOSPITAL[facility]  Progress Note     Claudetta Ingles Patient Status:  Inpatient    3/3/1955 MRN [id number]   [facility] 4SW-A Attending Jesús Jacob MD   [facility] Day # 1 PCP Yesica Pate MD     Chief Complaint: fatigue    S: Milena Guerra Labs   Lab  01/23/19   0520   PTP  14.0   INR  1.04       No results for input(s): TROP, CK in the last 168 hours. Imaging: Imaging data reviewed in Epic.     Medications:   • Alfuzosin HCl ER  10 mg Oral Daily with breakfast   • FLUoxetine HCl  20

## 2019-01-24 NOTE — PLAN OF CARE
Risk for Violence-Violent Restraints/Seclusion    • Patient will not express any violent or self-destructive behaviors Completed          CARDIOVASCULAR - ADULT    • Maintains optimal cardiac output and hemodynamic stability Progressing    • Absence of car

## 2019-01-24 NOTE — BH PROGRESS NOTE
Behavioral Health Note  CHIEF COMPLAINT  Alcohol withdrawal symptoms    REASON FOR ADMISSION  Alcohol withdrawal symptoms    SOCIAL HISTORY  Rubia Negro lives at home with his son and is  and retired. He does not smoke or use drugs.   He has a h/o alcoh that is guiding his treatment and he is not willing to divert much from this. He planning for follow-up with a psychiatrist at [facility] for medication management. He is taking Prozac, but does not feel that it is managing his depression.  He did agree t

## 2019-01-24 NOTE — PLAN OF CARE
DRUG ABUSE/DETOX    • Will have no detox symptoms and will verbalize plan for changing drug-related behavior Adequate for Discharge          CARDIOVASCULAR - ADULT    • Maintains optimal cardiac output and hemodynamic stability Completed    • Absence of ca

## 2019-01-25 NOTE — DISCHARGE SUMMARY
[facility]ITAL[facility]  DISCHARGE SUMMARY     Maru Laws Patient Status:  Inpatient    3/3/1955 MRN [id number]   [facility] 4SW-A Attending Tasha Carcamo MD   Hosp Day # 1 PCP Ortega Mckeon MD     Date of Admission: 2019  Da UROXATRAL      Take 1 tablet (10 mg total) by mouth daily with breakfast.   Quantity:  30 tablet  Refills:  0     apixaban 5 MG Tabs  Commonly known as:  ELIQUIS      Take 2 tabs (10mg) by mouth twice daily for 7 days, then take 1 tab (5mg) by mouth twice

## 2019-01-28 NOTE — PAYOR COMM NOTE
--------------  ADMISSION REVIEW     Payor: [facility]  Subscriber #:  [id number]  Authorization Number: [id number]    Admit date: 1/23/19  Admit time: 0071       Admitting Physician: Micheal Zuniga MD  Attending Physician:  No att. providers found  Primary Vodka per day for the past week    Drug use: No      Comment: Pt denies      Review of Systems    Positive for stated complaint: etoh      All other systems reviewed and negative except as noted above.     Physical Exam     ED Triage Vitals [01/22/19 2036] DIFFERENTIAL WITH PLATELET.   Procedure                               Abnormality         Status                     ---------                               -----------         ------                     CBC W/ DIFFERENTIAL[[id number]] 2: 27 AM Date of Service:  2019  1:27 AM Status:  Signed    :  Riri Ribeiro MD (Physician)           [facility]  History and Physical     Luz Marina Hernandez Patient Status:  Emergency    3/3/1955 MRN [id number]   [facility] drugs.     Family History:   Family History   Problem Relation Age of Onset   • Hypertension Father    • Lipids Father    • Heart Disorder Father    • Hypertension Mother        Allergies: No Known Allergies    Medications:    No current facility-administer Soft, nontender, nondistended. Positive bowel sounds. No rebound, guarding or organomegaly. Neurologic: No focal neurological deficits. CNII-XII grossly intact. Musculoskeletal: Moves all extremities. Extremities: No edema or cyanosis.   Integument: No 4SW-A Attending Alvino Bhat MD   Hosp Day # 1 PCP Mynor Mackenzie MD     Date of Admission: 1/22/2019  Date of Discharge: 1/24/2019  Discharge Disposition: Left Against Medical Advice    Discharge Diagnosis:   1. Alcohol abuse and w/d  2.  Hypo known as:  ELIQUIS      Take 2 tabs (10mg) by mouth twice daily for 7 days, then take 1 tab (5mg) by mouth twice daily thereafter.    Quantity:  74 tablet  Refills:  0     BABY ASPIRIN 81 MG Chew  Generic drug:  aspirin      1 TABLET DAILY   Refills:  0

## 2019-01-29 ENCOUNTER — HOSPITAL (OUTPATIENT)
Dept: OTHER | Age: 64
End: 2019-01-29
Attending: EMERGENCY MEDICINE

## 2019-01-29 LAB
ALBUMIN SERPL-MCNC: 3.6 GM/DL (ref 3.6–5.1)
ALP SERPL-CCNC: 122 UNIT/L (ref 45–117)
ALT SERPL-CCNC: 41 UNIT/L
ANALYZER ANC (IANC): NORMAL
ANION GAP SERPL CALC-SCNC: 17 MMOL/L (ref 10–20)
AST SERPL-CCNC: 53 UNIT/L
B-OH-BUTYR SERPL-SCNC: 0.2 MMOL/L (ref 0–0.3)
BASOPHILS # BLD: 0 THOUSAND/MCL (ref 0–0.3)
BASOPHILS NFR BLD: 1 %
BILIRUB CONJ SERPL-MCNC: <0.1 MG/DL (ref 0–0.2)
BILIRUB SERPL-MCNC: 0.5 MG/DL (ref 0.2–1)
BUN SERPL-MCNC: 3 MG/DL (ref 6–20)
BUN/CREAT SERPL: 3 (ref 7–25)
CALCIUM SERPL-MCNC: 8.6 MG/DL (ref 8.4–10.2)
CHLORIDE: 100 MMOL/L (ref 98–107)
CO2 SERPL-SCNC: 29 MMOL/L (ref 21–32)
CREAT SERPL-MCNC: 0.86 MG/DL (ref 0.67–1.17)
DIFFERENTIAL METHOD BLD: NORMAL
EOSINOPHIL # BLD: 0.1 THOUSAND/MCL (ref 0.1–0.5)
EOSINOPHIL NFR BLD: 2 %
ERYTHROCYTE [DISTWIDTH] IN BLOOD: 13.2 % (ref 11–15)
ETHANOL SERPL-MCNC: 339 MG/DL
GLUCOSE SERPL-MCNC: 188 MG/DL (ref 65–99)
HEMATOCRIT: 49.9 % (ref 39–51)
HGB BLD-MCNC: 16.9 GM/DL (ref 13–17)
IMM GRANULOCYTES # BLD AUTO: 0 THOUSAND/MCL (ref 0–0.2)
IMM GRANULOCYTES NFR BLD: 0 %
LIPASE SERPL-CCNC: 121 UNIT/L (ref 73–393)
LYMPHOCYTES # BLD: 1.2 THOUSAND/MCL (ref 1–4)
LYMPHOCYTES NFR BLD: 24 %
MAGNESIUM SERPL-MCNC: 2 MG/DL (ref 1.7–2.4)
MCH RBC QN AUTO: 29.8 PG (ref 26–34)
MCHC RBC AUTO-ENTMCNC: 33.9 GM/DL (ref 32–36.5)
MCV RBC AUTO: 87.9 FL (ref 78–100)
MONOCYTES # BLD: 0.5 THOUSAND/MCL (ref 0.3–0.9)
MONOCYTES NFR BLD: 11 %
NEUTROPHILS # BLD: 3.1 THOUSAND/MCL (ref 1.8–7.7)
NEUTROPHILS NFR BLD: 62 %
NEUTS SEG NFR BLD: NORMAL %
NRBC (NRBCRE): 0 /100 WBC
PLATELET # BLD: 254 THOUSAND/MCL (ref 140–450)
POTASSIUM SERPL-SCNC: 3.9 MMOL/L (ref 3.4–5.1)
PROT SERPL-MCNC: 8.7 GM/DL (ref 6.4–8.2)
RBC # BLD: 5.68 MILLION/MCL (ref 4.5–5.9)
SODIUM SERPL-SCNC: 142 MMOL/L (ref 135–145)
WBC # BLD: 5 THOUSAND/MCL (ref 4.2–11)

## 2019-01-30 NOTE — ED NOTES
Pt is attempting to get a ride home from his 25yr old son whom is arguing with the pt on the phone and does not want to  his dad because it is -50 degrees outside and pt is not going inpatient for ETOH abuse.

## 2019-01-30 NOTE — ED PROVIDER NOTES
Patient Seen in: [facility] Emergency Department    History   Patient presents with:  Alcohol Intoxication (neurologic)    Stated Complaint: EVAL-P (ETOH ABUSE)    HPI    This is a 52-year-old male with past medical history of sleep apnea noncomplia for the past week    Drug use: No      Comment: Pt denies      Review of Systems    Positive for stated complaint: EVAL-P (ETOH ABUSE)  Other systems are as noted in HPI. Constitutional and vital signs reviewed.       All other systems reviewed and negativ Abnormality         Status                     ---------                               -----------         ------                     CBC W/ DIFFERENTIAL[[id number]]                              Final result                 Please view results for these tia

## 2019-01-30 NOTE — ED NOTES
Pt belongings secured in smart safe bag.      [id number]    1 pair of jeans  1 pair of boots  1 hat  1 sweatshirt  1 phone

## 2019-01-30 NOTE — ED PROVIDER NOTES
79-year-old male with history of alcoholism presents with alcohol withdrawal requesting detox. He was evaluated by [facility] and accepted.   However, he is well known to the facility and due to his sleep apnea and poor compliance they want him to sleep i

## 2019-01-30 NOTE — BH LEVEL OF CARE ASSESSMENT
Level of Care Assessment Note    General Questions  Why are you here?: PT IS A PLEASANT 59 YR OLD MALE WHO ARRIVES TO [place] [address] EMERGENCY DEPT ACCOMPANIED BY HIS SON W/ A REQUEST FOR ADMIT TO [facility] FOR ETOH DETOX. BAL = 286 @ 1908.   CONSUMED SIX 1 Info: SELF-REFERRAL    Suicide Risk  Source of information for CSSR: Patient;Collateral  In what setting is the screener performed?: in person  1. Have you wished you were dead or wished you could go to sleep and not wake up? (past 30 days): No  2.  Have yo No    IBW Calculations  Weight: 190 lb  BMI (Calculated): 29.8  IBW LBS Hamwi: 148 LBS  IBW %: 128.38 %  IBW + 10%: 162.8 LBS  IBW - 10%: 133.2 LBS                                                                            Current/Previous MH/CD Providers MEDICAL FLOOR TO ICU D/Y CIWA OF 15 EVEN W/ IV ATIVAN -   Current Withdrawal Symptoms: No  Breathalyzer: 286(BAL @ 1908)                                                   Functional Impairment  Currently Attending School: No  Employment Status: Retired  Judson Bun Participated; Appropriate to situation    Assessment Summary  Assessment Summary: PT IS A PLEASANT 59 YR OLD MALE WHO ARRIVES TO [place] [address] EMERGENCY DEPT ACCOMPANIED BY HIS SON W/ A REQUEST FOR ADMIT TO [facility] FOR ETOH DETOX. BAL = 286 @ 1908.   CONS of Change: Preparation    Level of Care Recommendations  Consulted with: Nelly Davidson MD   Level of Care Recommendation: Inpatient Acute Care  Unit: CDU  Inpatient Criteria: Medical detox  Behavioral Precautions: Close Observation  Medical Precautions: Joaquim Brooks

## 2019-01-31 PROBLEM — R56.9 SEIZURE (HCC): Status: ACTIVE | Noted: 2019-01-31

## 2019-01-31 PROBLEM — Z79.01 ON CONTINUOUS ORAL ANTICOAGULATION: Status: ACTIVE | Noted: 2019-01-31

## 2019-02-13 ENCOUNTER — LAB ENCOUNTER (OUTPATIENT)
Dept: LAB | Age: 64
End: 2019-02-13
Attending: INTERNAL MEDICINE
Payer: COMMERCIAL

## 2019-02-13 DIAGNOSIS — I10 HTN (HYPERTENSION): ICD-10-CM

## 2019-02-13 DIAGNOSIS — K76.9 LIVER DAMAGE: Primary | ICD-10-CM

## 2019-02-13 LAB
ALBUMIN SERPL-MCNC: 3.5 G/DL (ref 3.4–5)
ALBUMIN/GLOB SERPL: 0.8 {RATIO} (ref 1–2)
ALP LIVER SERPL-CCNC: 103 U/L (ref 45–117)
ALT SERPL-CCNC: 51 U/L (ref 16–61)
ANION GAP SERPL CALC-SCNC: 6 MMOL/L (ref 0–18)
AST SERPL-CCNC: 40 U/L (ref 15–37)
BASOPHILS # BLD AUTO: 0.06 X10(3) UL (ref 0–0.2)
BASOPHILS NFR BLD AUTO: 1.2 %
BILIRUB SERPL-MCNC: 0.3 MG/DL (ref 0.1–2)
BUN BLD-MCNC: 18 MG/DL (ref 7–18)
BUN/CREAT SERPL: 17.8 (ref 10–20)
CALCIUM BLD-MCNC: 9.4 MG/DL (ref 8.5–10.1)
CHLORIDE SERPL-SCNC: 103 MMOL/L (ref 98–107)
CO2 SERPL-SCNC: 33 MMOL/L (ref 21–32)
CREAT BLD-MCNC: 1.01 MG/DL (ref 0.7–1.3)
DEPRECATED RDW RBC AUTO: 49.8 FL (ref 35.1–46.3)
EOSINOPHIL # BLD AUTO: 0.41 X10(3) UL (ref 0–0.7)
EOSINOPHIL NFR BLD AUTO: 8.4 %
ERYTHROCYTE [DISTWIDTH] IN BLOOD BY AUTOMATED COUNT: 14 % (ref 11–15)
GLOBULIN PLAS-MCNC: 4.5 G/DL (ref 2.8–4.4)
GLUCOSE BLD-MCNC: 102 MG/DL (ref 70–99)
HCT VFR BLD AUTO: 43.1 % (ref 39–53)
HGB BLD-MCNC: 13.7 G/DL (ref 13–17.5)
IMM GRANULOCYTES # BLD AUTO: 0.02 X10(3) UL (ref 0–1)
IMM GRANULOCYTES NFR BLD: 0.4 %
LYMPHOCYTES # BLD AUTO: 1.34 X10(3) UL (ref 1–4)
LYMPHOCYTES NFR BLD AUTO: 27.3 %
M PROTEIN MFR SERPL ELPH: 8 G/DL (ref 6.4–8.2)
MCH RBC QN AUTO: 30.6 PG (ref 26–34)
MCHC RBC AUTO-ENTMCNC: 31.8 G/DL (ref 31–37)
MCV RBC AUTO: 96.4 FL (ref 80–100)
MONOCYTES # BLD AUTO: 0.8 X10(3) UL (ref 0.1–1)
MONOCYTES NFR BLD AUTO: 16.3 %
NEUTROPHILS # BLD AUTO: 2.28 X10 (3) UL (ref 1.5–7.7)
NEUTROPHILS # BLD AUTO: 2.28 X10(3) UL (ref 1.5–7.7)
NEUTROPHILS NFR BLD AUTO: 46.4 %
OSMOLALITY SERPL CALC.SUM OF ELEC: 296 MOSM/KG (ref 275–295)
PLATELET # BLD AUTO: 334 10(3)UL (ref 150–450)
POTASSIUM SERPL-SCNC: 4.6 MMOL/L (ref 3.5–5.1)
RBC # BLD AUTO: 4.47 X10(6)UL (ref 4.3–5.7)
SODIUM SERPL-SCNC: 142 MMOL/L (ref 136–145)
WBC # BLD AUTO: 4.9 X10(3) UL (ref 4–11)

## 2019-02-13 PROCEDURE — 85025 COMPLETE CBC W/AUTO DIFF WBC: CPT

## 2019-02-13 PROCEDURE — 80053 COMPREHEN METABOLIC PANEL: CPT

## 2019-03-08 PROCEDURE — 81240 F2 GENE: CPT | Performed by: INTERNAL MEDICINE

## 2019-03-08 PROCEDURE — 85613 RUSSELL VIPER VENOM DILUTED: CPT | Performed by: INTERNAL MEDICINE

## 2019-03-08 PROCEDURE — 85300 ANTITHROMBIN III ACTIVITY: CPT | Performed by: INTERNAL MEDICINE

## 2019-03-08 PROCEDURE — 85303 CLOT INHIBIT PROT C ACTIVITY: CPT | Performed by: INTERNAL MEDICINE

## 2019-03-08 PROCEDURE — 85705 THROMBOPLASTIN INHIBITION: CPT | Performed by: INTERNAL MEDICINE

## 2019-03-08 PROCEDURE — 81241 F5 GENE: CPT | Performed by: INTERNAL MEDICINE

## 2019-03-08 PROCEDURE — 85306 CLOT INHIBIT PROT S FREE: CPT | Performed by: INTERNAL MEDICINE

## 2019-03-08 PROCEDURE — 85610 PROTHROMBIN TIME: CPT | Performed by: INTERNAL MEDICINE

## 2019-03-08 PROCEDURE — 85732 THROMBOPLASTIN TIME PARTIAL: CPT | Performed by: INTERNAL MEDICINE

## 2019-03-08 PROCEDURE — G0452 MOLECULAR PATHOLOGY INTERPR: HCPCS | Performed by: INTERNAL MEDICINE

## 2019-03-08 PROCEDURE — 85305 CLOT INHIBIT PROT S TOTAL: CPT | Performed by: INTERNAL MEDICINE

## 2019-03-11 ENCOUNTER — HOSPITAL (OUTPATIENT)
Dept: OTHER | Age: 64
End: 2019-03-11
Attending: INTERNAL MEDICINE

## 2019-03-11 LAB
ANION GAP SERPL CALC-SCNC: 15 MMOL/L (ref 10–20)
ANION GAP SERPL CALC-SCNC: 15 MMOL/L (ref 10–20)
BUN SERPL-MCNC: 24 MG/DL (ref 6–20)
BUN SERPL-MCNC: 24 MG/DL (ref 6–20)
BUN/CREAT SERPL: 21 (ref 7–25)
BUN/CREAT SERPL: 21 (ref 7–25)
CALCIUM SERPL-MCNC: 8.9 MG/DL (ref 8.4–10.2)
CALCIUM SERPL-MCNC: 8.9 MG/DL (ref 8.4–10.2)
CHLORIDE SERPL-SCNC: 102 MMOL/L (ref 98–107)
CHLORIDE: 102 MMOL/L (ref 98–107)
CO2 SERPL-SCNC: 29 MMOL/L (ref 21–32)
CO2 SERPL-SCNC: 29 MMOL/L (ref 21–32)
CREAT SERPL-MCNC: 1.15 MG/DL (ref 0.67–1.17)
CREAT SERPL-MCNC: 1.15 MG/DL (ref 0.67–1.17)
GLUCOSE SERPL-MCNC: 165 MG/DL (ref 65–99)
GLUCOSE SERPL-MCNC: 165 MG/DL (ref 65–99)
LENGTH OF FAST TIME PATIENT: ABNORMAL HR
LENGTH OF FAST TIME PATIENT: ABNORMAL HRS
POTASSIUM SERPL-SCNC: 4.1 MMOL/L (ref 3.4–5.1)
POTASSIUM SERPL-SCNC: 4.1 MMOL/L (ref 3.4–5.1)
SODIUM SERPL-SCNC: 142 MMOL/L (ref 135–145)
SODIUM SERPL-SCNC: 142 MMOL/L (ref 135–145)
TESTOST SERPL-MCNC: 201.4 NG/DL (ref 280–1100)
TESTOST SERPL-MCNC: 201.4 NG/DL (ref 280–1100)

## 2019-05-25 ENCOUNTER — HOSPITAL (OUTPATIENT)
Dept: OTHER | Age: 64
End: 2019-05-25

## 2019-05-26 ENCOUNTER — HOSPITAL (OUTPATIENT)
Dept: OTHER | Age: 64
End: 2019-05-26

## 2019-05-28 ENCOUNTER — HOSPITAL (OUTPATIENT)
Dept: OTHER | Age: 64
End: 2019-05-28

## 2019-05-31 ENCOUNTER — DIAGNOSTIC TRANS (OUTPATIENT)
Dept: OTHER | Age: 64
End: 2019-05-31

## 2019-05-31 ENCOUNTER — HOSPITAL (OUTPATIENT)
Dept: OTHER | Age: 64
End: 2019-05-31

## 2019-06-01 LAB
ANION GAP SERPL CALC-SCNC: 10 MMOL/L (ref 10–20)
BUN SERPL-MCNC: 3 MG/DL (ref 6–20)
BUN/CREAT SERPL: 4 (ref 7–25)
CALCIUM SERPL-MCNC: 8 MG/DL (ref 8.4–10.2)
CHLORIDE SERPL-SCNC: 106 MMOL/L (ref 98–107)
CO2 SERPL-SCNC: 28 MMOL/L (ref 21–32)
CREAT SERPL-MCNC: 0.82 MG/DL (ref 0.67–1.17)
GLUCOSE SERPL-MCNC: 105 MG/DL (ref 65–99)
MAGNESIUM SERPL-MCNC: 2.1 MG/DL (ref 1.7–2.4)
POTASSIUM SERPL-SCNC: 4.1 MMOL/L (ref 3.4–5.1)
SODIUM SERPL-SCNC: 140 MMOL/L (ref 135–145)

## 2019-06-01 NOTE — BH LEVEL OF CARE ASSESSMENT
Level of Care Assessment Note    General Questions  Why are you here?: \"Want detox. \"   Precipitating Events: Pt reports he relapsed on alcohol 3 or 4 days ago and now desires detox tx.    History of Present Illness: Pt poor historian during this assessmen yourself? (past 30 days): No  6. Have you ever done anything, started to do anything, or prepared to do anything to end your life? (lifetime): No  Score - BH OV: 0- Low Risk   Describe : Pt denies hx or presence of SI.   Is your experience of thoughts of dy Loss: No  Unplanned Weight Gain: No  History of Eating Disorder: No  Active Eating Disorder: No    IBW Calculations  Weight: 230 lb  BMI (Calculated): 37.1  IBW LBS Hamwi: 142 LBS  IBW %: 161.97 %  IBW + 10%: 156.2 LBS  IBW - 10%: 127.8 LBS  SCOFF Question   Effectiveness: uk           Current/Previous MH/CD Treatment  Recovery Support Groups: Denies Past History  History of Seclusion/Restraint: No    Alcohol Use  How often do you have a drink containing alcohol? : 4 or more times per week  Alcohol Use  Ag the following behaviors over the past 30 days?: Denies                                              Functional Impairment  Currently Attending School: No  Employment Status: Retired  Job Issues: (n/a)  Concerns/Conflicts with Social Relationships: No  Decr Participated    Assessment Summary  Assessment Summary: Pt is 65y/o M presenting in [facility] [place] [facility] ER due to alcohol withdrawal symptoms, requesting medical detox tx. Pt presents with following withdrawal symptoms: Anxiety; Elevated BP; Cravings; Irritability;  Swe Care Recommendations  Consulted with: Dr. Lo Mata MD  Level of Care Recommendation: Inpatient Acute Care  Unit: CDU  Reason for Unit Assigned: age and symptoms; ETOH withdrawal  Inpatient Criteria: Medical detox  Behavioral Precautions: Close Observation

## 2019-06-01 NOTE — ED NOTES
Report given to [facility] CD unit and pre-cert obtained for pt. Handoff given to ER RNAllan.

## 2019-06-01 NOTE — ED INITIAL ASSESSMENT (HPI)
Patient from [facility] after going there for detox. Patient was found to be hypertensive, about 180s/120 and alcohol level of 212. Patient states he has not been taking his HTN meds and had two shots of whiskey this morning.

## 2019-06-01 NOTE — ED PROVIDER NOTES
Patient Seen in: [facility] Emergency Department    History   Patient presents with:  Alcohol Intoxication (neurologic)  Hypertension (cardiovascular)    Stated Complaint: etoh, htn    HPI    This is a 72-year-old male with past medical history of h week      Comment: drank this morning 2 shots of whiskey, prior to this morning had not been drinking for 5 months    Drug use: No      Comment: Pt denies      Review of Systems    Positive for stated complaint: etoh, htn  Other systems are as noted in HPI CBC W/ DIFFERENTIAL[[id number]]                              Final result                 Please view results for these tests on the individual orders.    RAINBOW DRAW BLUE   RAINBOW DRAW LAVENDER   RAINBOW DRAW LIGHT GREEN   RAINBOW DRAW GOLD

## 2021-03-12 DIAGNOSIS — Z23 NEED FOR VACCINATION: ICD-10-CM

## 2023-09-13 NOTE — ED PROVIDER NOTES
Patient Seen in: [facility] Emergency Department    History   Patient presents with:  Alcohol Intoxication (neurologic)    Stated Complaint: ETOH    HPI    [place] Pare a 65year old male presents with chronic alcoholism.  He requests admission f 1/1/1990  Smokeless tobacco: Never Used                      Alcohol use: Yes           56.4 oz/week     Cans of beer: 24, Shots of liquor: 70 per week     Comment: Reports to drink 5-6 beers and 1/5 Vodka               per day      Review of Systems    Po Albumin 3.1 (*)     All other components within normal limits   ETHYL ALCOHOL - Abnormal; Notable for the following:     Ethyl Alcohol 240 (*)     All other components within normal limits   DRUG SCREEN 7 W/OUT CONFIRMATION, URINE - Abnormal; [facility]

## 2024-08-22 NOTE — BH LEVEL OF CARE ASSESSMENT
Level of Care Assessment Note  General Questions  Why are you here?: Pt reported \" I need help. I am drinking 12 pack of beer daily for the past 3 weeks.  I was sober since I got d/c from [facility] in April ( 4//24/17) and then I relapsed on drinking 3 weeks ago Medication:  LOSARTAN/HCTZ 50/12.5MG TABLETS         ARB Angiotension Receptor Blocker - Angiotension II Receptor Antagonist Refill Protocol - 12 Month Protocol [id number]/2024 10:31 AM   Protocol Details Last BP was equal to or less than 150/100 -- IF CRITERIA FAILED REFER TO PROTOCOL DETAILS    eGFR resulted within last 12 months -- IF CRITERIA FAILED REFER TO PROTOCOL DETAILS    Seen by prescribing provider or same department within the last 12 months or has a future appt in 3 months - IF FAILED PLEASE LOOK AT CHART REVIEW FOR LAST VISIT AND PROCEED ACCORDINGLY    Normal Potassium within last 12 months looking at last value -- IF CRITERIA FAILED REFER TO PROTOCOL DETAILS    Medication (including dose and sig) on current meds list    Current med list does not contain more than one ARB Angiotensin II Receptor Antagonists medication    eGFR greater than 10 resulted within last 12 months looking at last value -- IF CRITERIA FAILED REFER TO PROTOCOL DETAILS       To be filled at: [id number] [address] AT [address] [place] [address]     Last office visit date: 1/17/24 No follow up scheduled   Medication Refill Protocol Failed.  Failed criteria: Rx. Sent to clinician to review.      Inappropriate Physical Contact: No  Current/Recent Destructive Behavior Toward Property: No  Past Destructive Behavior Toward Property: No  Danger to Others/Property Collateral Provided By: None available      Access to Means  Access to Means: Yes  Gricelda Pretty beverages/tablets) Use: No  Used substances (other than as prescribed) in the past 30 days?: Yes  Chemical Abuse Screen  Tobacco Use: Never  smoked/never used tobacco product  Used substances (other than as prescribed) in the past 30 days?: Yes  Chemical 1 situation; Cooperative  Gait/Movement:  (JERAD- Pt was in a hospital bed )  Speech Characteristics: Normal rate;Normal rhythm;Normal volume  Concentration: Unimpaired  Memory: Recent memory intact; Remote memory intact  Orientation Level: Oriented X4  Thought No  12. Poor Social Support: No  13.  Alcohol or Drug Abuse: Yes  Factors Mitigating Risk  Factors Mitigating Risk: Pt denied any SI   SRAT Review  Precautions: Close Observation;Fall;Seizure  SRAT reviewed with: Dr. Juan Luis Dunne

## (undated) NOTE — ED AVS SNAPSHOT
Asia Ramirez   MRN: [id number]    Department:  [facility] Emergency Department   Date of Visit:  10/3/2017           Disclosure     Insurance plans vary and the physician(s) referred by the ER may not be covered by your plan.  Please contact your i If you have been prescribed any medication(s), please fill your prescription right away and begin taking the medication(s) as directed    If the emergency physician has read X-rays, these will be re-interpreted by a radiologist.  If there is a significant

## (undated) NOTE — ED AVS SNAPSHOT
Hayley Cunha   MRN: [id number]    Department:  [facility] Emergency Department   Date of Visit:  11/5/2018           Disclosure     Insurance plans vary and the physician(s) referred by the ER may not be covered by your plan.  Please contact your i tell this physician (or your personal doctor if your instructions are to return to your personal doctor) about any new or lasting problems. The primary care or specialist physician will see patients referred from the [facility] Emergency Department.  Richie Javier

## (undated) NOTE — ED AVS SNAPSHOT
[facility] Emergency Department    [place]  [address] [address]    Phone:  [phone]    Fax:  [address] [facility]   MRN: [id number]    Department:  [facility] Emergency Department   Date of Visit:  1/27/ take both doses of the new and old medication. ONLY take the dose prescribed today.             Where to Get Your Medications      You can get these medications from any pharmacy     Bring a paper prescription for each of these medications    - [facility] Emergency Department. Follow-up care is at the discretion of that Physician. IF THERE IS ANY CHANGE OR WORSENING OF YOUR CONDITION, CALL YOUR PRIMARY CARE PHYSICIAN AT ONCE OR RETURN IMMEDIATELY TO THE EMERGENCY DEPARTMENT.     If you hav harming yourself, contact [facility] at [phone]. - If you don’t have insurance, [facility] has partnered with [facility] [address] to help you get signed up for insurance coverage.   Patient Matteson

## (undated) NOTE — ED AVS SNAPSHOT
Solange Fraire   MRN: [id number]    Department:  [facility] Emergency Department   Date of Visit:  5/28/2018           Disclosure     Insurance plans vary and the physician(s) referred by the ER may not be covered by your plan.  Please contact your i tell this physician (or your personal doctor if your instructions are to return to your personal doctor) about any new or lasting problems. The primary care or specialist physician will see patients referred from the [facility] Emergency Department.  Wing Freitas

## (undated) NOTE — ED AVS SNAPSHOT
Dione Abdul   MRN: [id number]    Department:  [facility] Emergency Department   Date of Visit:  1/29/2019           Disclosure     Insurance plans vary and the physician(s) referred by the ER may not be covered by your plan.  Please contact your i tell this physician (or your personal doctor if your instructions are to return to your personal doctor) about any new or lasting problems. The primary care or specialist physician will see patients referred from the [facility] Emergency Department.  Norm Nieves

## (undated) NOTE — ED AVS SNAPSHOT
[facility] Emergency Department    [place]  [address] [address]    Phone:  [phone]    Fax:  [phone] [facility]   MRN: [id number]    Department:  [facility] Emergency Department   Date of Visit:  1/27/ IF THERE IS ANY CHANGE OR WORSENING OF YOUR CONDITION, CALL YOUR PRIMARY CARE PHYSICIAN AT ONCE OR RETURN IMMEDIATELY TO THE EMERGENCY DEPARTMENT.     If you have been prescribed any medication(s), please fill your prescription right away and begin taking t